# Patient Record
Sex: MALE | Race: BLACK OR AFRICAN AMERICAN | NOT HISPANIC OR LATINO | Employment: FULL TIME | URBAN - METROPOLITAN AREA
[De-identification: names, ages, dates, MRNs, and addresses within clinical notes are randomized per-mention and may not be internally consistent; named-entity substitution may affect disease eponyms.]

---

## 2018-09-07 ENCOUNTER — CLINICAL SUPPORT (OUTPATIENT)
Dept: OCCUPATIONAL MEDICINE | Facility: CLINIC | Age: 28
End: 2018-09-07
Payer: OTHER MISCELLANEOUS

## 2018-09-07 VITALS
BODY MASS INDEX: 31.92 KG/M2 | SYSTOLIC BLOOD PRESSURE: 125 MMHG | HEART RATE: 50 BPM | DIASTOLIC BLOOD PRESSURE: 88 MMHG | HEIGHT: 70 IN | WEIGHT: 223 LBS | RESPIRATION RATE: 15 BRPM | OXYGEN SATURATION: 98 % | TEMPERATURE: 98 F

## 2018-09-07 DIAGNOSIS — J06.9 VIRAL URI WITH COUGH: ICD-10-CM

## 2018-09-07 DIAGNOSIS — R68.89 FLU-LIKE SYMPTOMS: Primary | ICD-10-CM

## 2018-09-07 LAB
CTP QC/QA: YES
FLUAV AG NPH QL: NEGATIVE
FLUBV AG NPH QL: NEGATIVE

## 2018-09-07 PROCEDURE — 99204 OFFICE O/P NEW MOD 45 MIN: CPT | Mod: S$GLB,,, | Performed by: FAMILY MEDICINE

## 2018-09-07 PROCEDURE — 87804 INFLUENZA ASSAY W/OPTIC: CPT | Mod: 59,QW,S$GLB, | Performed by: FAMILY MEDICINE

## 2018-09-07 RX ORDER — BENZONATATE 200 MG/1
200 CAPSULE ORAL 3 TIMES DAILY PRN
Qty: 20 CAPSULE | Refills: 0 | Status: SHIPPED | OUTPATIENT
Start: 2018-09-07 | End: 2023-07-27

## 2018-09-07 NOTE — PROGRESS NOTES
Subjective:       Patient ID: Samir Farias is a 27 y.o. male.    Chief Complaint: Annual Exam    Pt said he felt sick while he was at work. He said he vomited and felt weak. He said he is coughing still.      URI    This is a new problem. The current episode started in the past 7 days. The problem has been unchanged. There has been no fever. Associated symptoms include coughing and headaches. Pertinent negatives include no abdominal pain, chest pain, congestion, ear pain, nausea, sore throat or wheezing. He has tried acetaminophen for the symptoms. The treatment provided mild relief.    patient exposed to a co-worker with similar symptoms last week.  He is unsure about fever is persistent symptom is cough productive of yellowish-green sputum he is overall starting to feel better however.  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  Review of Systems   Constitution: Positive for fever and weakness. Negative for chills and malaise/fatigue.   HENT: Negative for congestion, ear pain, hoarse voice and sore throat.    Eyes: Negative for discharge and redness.   Cardiovascular: Negative for chest pain, dyspnea on exertion and leg swelling.   Respiratory: Positive for cough. Negative for shortness of breath, sputum production and wheezing.    Musculoskeletal: Negative for myalgias.   Gastrointestinal: Negative for abdominal pain and nausea.   Neurological: Positive for headaches.       Objective:      Physical Exam   Constitutional: He appears well-developed and well-nourished. No distress.   HENT:   Head: Normocephalic and atraumatic.   Right Ear: External ear normal.   Left Ear: External ear normal.   Mouth/Throat: Oropharynx is clear and moist. No oropharyngeal exudate.   Eyes: Right eye exhibits no discharge. Left eye exhibits no discharge.   Neck: Normal range of motion.   Cardiovascular: Normal rate, regular rhythm, normal heart sounds and intact distal pulses.    Pulmonary/Chest: Breath sounds normal. He has no wheezes. He has no rales.   Lymphadenopathy:     He has no cervical adenopathy.   Nursing note and vitals reviewed.      Assessment:       No diagnosis found.    Plan:                   No Follow-up on file.

## 2018-09-07 NOTE — PATIENT INSTRUCTIONS
Viral Upper Respiratory Illness (Adult)  You have a viral upper respiratory illness (URI), which is another term for the common cold. This illness is contagious during the first few days. It is spread through the air by coughing and sneezing. It may also be spread by direct contact (touching the sick person and then touching your own eyes, nose, or mouth). Frequent handwashing will decrease risk of spread. Most viral illnesses go away within 7 to 10 days with rest and simple home remedies. Sometimes the illness may last for several weeks. Antibiotics will not kill a virus, and they are generally not prescribed for this condition.    Home care  · If symptoms are severe, rest at home for the first 2 to 3 days. When you resume activity, don't let yourself get too tired.  · Avoid being exposed to cigarette smoke (yours or others).  · You may use acetaminophen or ibuprofen to control pain and fever, unless another medicine was prescribed. (Note: If you have chronic liver or kidney disease, have ever had a stomach ulcer or gastrointestinal bleeding, or are taking blood-thinning medicines, talk with your healthcare provider before using these medicines.) Aspirin should never be given to anyone under 18 years of age who is ill with a viral infection or fever. It may cause severe liver or brain damage.  · Your appetite may be poor, so a light diet is fine. Avoid dehydration by drinking 6 to 8 glasses of fluids per day (water, soft drinks, juices, tea, or soup). Extra fluids will help loosen secretions in the nose and lungs.  · Over-the-counter cold medicines will not shorten the length of time youre sick, but they may be helpful for the following symptoms: cough, sore throat, and nasal and sinus congestion. (Note: Do not use decongestants if you have high blood pressure.)  Follow-up care  Follow up with your healthcare provider, or as advised.  When to seek medical advice  Call your healthcare provider right away if any  of these occur:  · Cough with lots of colored sputum (mucus)  · Severe headache; face, neck, or ear pain  · Difficulty swallowing due to throat pain  · Fever of 100.4°F (38°C)  Call 911, or get immediate medical care  Call emergency services right away if any of these occur:  · Chest pain, shortness of breath, wheezing, or difficulty breathing  · Coughing up blood  · Inability to swallow due to throat pain  Date Last Reviewed: 9/13/2015  © 0145-6217 [employer]. [address] [place] [address]. All rights reserved. This information is not intended as a substitute for professional medical care. Always follow your healthcare professional's instructions.

## 2019-12-28 ENCOUNTER — OFFICE VISIT (OUTPATIENT)
Dept: URGENT CARE | Facility: CLINIC | Age: 29
End: 2019-12-28
Payer: COMMERCIAL

## 2019-12-28 VITALS
HEIGHT: 70 IN | WEIGHT: 223 LBS | OXYGEN SATURATION: 100 % | DIASTOLIC BLOOD PRESSURE: 100 MMHG | SYSTOLIC BLOOD PRESSURE: 142 MMHG | HEART RATE: 84 BPM | RESPIRATION RATE: 18 BRPM | BODY MASS INDEX: 31.92 KG/M2 | TEMPERATURE: 99 F

## 2019-12-28 DIAGNOSIS — Z20.2 EXPOSURE TO STD: Primary | ICD-10-CM

## 2019-12-28 PROCEDURE — 99214 OFFICE O/P EST MOD 30 MIN: CPT | Mod: S$GLB,,, | Performed by: PHYSICIAN ASSISTANT

## 2019-12-28 PROCEDURE — 99214 PR OFFICE/OUTPT VISIT, EST, LEVL IV, 30-39 MIN: ICD-10-PCS | Mod: S$GLB,,, | Performed by: PHYSICIAN ASSISTANT

## 2019-12-28 RX ORDER — AZITHROMYCIN 1 G/1
1 POWDER, FOR SUSPENSION ORAL ONCE
Qty: 1 PACKET | Refills: 0 | Status: SHIPPED | OUTPATIENT
Start: 2019-12-28 | End: 2019-12-28

## 2019-12-28 NOTE — PATIENT INSTRUCTIONS

## 2020-01-01 LAB
C TRACH RRNA SPEC QL NAA+PROBE: NEGATIVE
N GONORRHOEA RRNA SPEC QL NAA+PROBE: NEGATIVE

## 2020-01-02 ENCOUNTER — TELEPHONE (OUTPATIENT)
Dept: URGENT CARE | Facility: CLINIC | Age: 30
End: 2020-01-02

## 2020-01-02 NOTE — TELEPHONE ENCOUNTER
Gave Results    ----- Message from Dru Acosta PA-C sent at 1/2/2020 10:23 AM CST -----  Please call patient regarding neg test results . Please reply to me once completed. Thanks!

## 2020-01-02 NOTE — TELEPHONE ENCOUNTER
"Patient reports that he was concerned about 2 "bumps in my groin area" at the time of testing "They itched for a little bit, But they went away" informed patient that he may RTC if rash presents again, but it was likely folliculitis that self-resolved. Patient v/u.  "

## 2023-04-01 ENCOUNTER — HOSPITAL ENCOUNTER (INPATIENT)
Facility: HOSPITAL | Age: 33
LOS: 1 days | Discharge: HOME | DRG: 091 | End: 2023-04-02
Attending: PSYCHIATRY & NEUROLOGY | Admitting: PSYCHIATRY & NEUROLOGY
Payer: COMMERCIAL

## 2023-04-01 DIAGNOSIS — G08 TRANSVERSE SINUS THROMBOSIS: Primary | ICD-10-CM

## 2023-04-01 DIAGNOSIS — I67.1 CEREBRAL ANEURYSM: ICD-10-CM

## 2023-04-01 PROBLEM — Q28.3 VASCULAR ABNORMALITY OF BRAIN: Status: ACTIVE | Noted: 2023-04-01

## 2023-04-01 PROCEDURE — 99222 PR INITIAL HOSPITAL CARE,LEVL II: ICD-10-PCS | Mod: ,,, | Performed by: NEUROLOGICAL SURGERY

## 2023-04-01 PROCEDURE — 11000001 HC ACUTE MED/SURG PRIVATE ROOM

## 2023-04-01 PROCEDURE — 99223 PR INITIAL HOSPITAL CARE,LEVL III: ICD-10-PCS | Mod: ,,, | Performed by: PSYCHIATRY & NEUROLOGY

## 2023-04-01 PROCEDURE — 63600175 PHARM REV CODE 636 W HCPCS: Performed by: RADIOLOGY

## 2023-04-01 PROCEDURE — 99222 1ST HOSP IP/OBS MODERATE 55: CPT | Mod: ,,, | Performed by: NEUROLOGICAL SURGERY

## 2023-04-01 PROCEDURE — 99223 1ST HOSP IP/OBS HIGH 75: CPT | Mod: ,,, | Performed by: PSYCHIATRY & NEUROLOGY

## 2023-04-01 PROCEDURE — 25000003 PHARM REV CODE 250

## 2023-04-01 PROCEDURE — 25000003 PHARM REV CODE 250: Performed by: PSYCHIATRY & NEUROLOGY

## 2023-04-01 PROCEDURE — 25500020 PHARM REV CODE 255: Performed by: PSYCHIATRY & NEUROLOGY

## 2023-04-01 RX ORDER — SODIUM CHLORIDE 9 MG/ML
INJECTION, SOLUTION INTRAVENOUS CONTINUOUS
Status: ACTIVE | OUTPATIENT
Start: 2023-04-01 | End: 2023-04-01

## 2023-04-01 RX ORDER — MUPIROCIN 20 MG/G
OINTMENT TOPICAL 2 TIMES DAILY
Status: DISCONTINUED | OUTPATIENT
Start: 2023-04-01 | End: 2023-04-02 | Stop reason: HOSPADM

## 2023-04-01 RX ORDER — MIDAZOLAM HYDROCHLORIDE 1 MG/ML
INJECTION INTRAMUSCULAR; INTRAVENOUS
Status: COMPLETED | OUTPATIENT
Start: 2023-04-01 | End: 2023-04-01

## 2023-04-01 RX ORDER — FENTANYL CITRATE 50 UG/ML
INJECTION, SOLUTION INTRAMUSCULAR; INTRAVENOUS
Status: COMPLETED | OUTPATIENT
Start: 2023-04-01 | End: 2023-04-01

## 2023-04-01 RX ORDER — HEPARIN SODIUM 5000 [USP'U]/ML
5000 INJECTION, SOLUTION INTRAVENOUS; SUBCUTANEOUS EVERY 8 HOURS
Status: DISCONTINUED | OUTPATIENT
Start: 2023-04-01 | End: 2023-04-01

## 2023-04-01 RX ORDER — SODIUM CHLORIDE 0.9 % (FLUSH) 0.9 %
10 SYRINGE (ML) INJECTION
Status: DISCONTINUED | OUTPATIENT
Start: 2023-04-01 | End: 2023-04-02 | Stop reason: HOSPADM

## 2023-04-01 RX ORDER — IODIXANOL 320 MG/ML
200 INJECTION, SOLUTION INTRAVASCULAR
Status: COMPLETED | OUTPATIENT
Start: 2023-04-01 | End: 2023-04-01

## 2023-04-01 RX ORDER — LABETALOL HCL 20 MG/4 ML
10 SYRINGE (ML) INTRAVENOUS
Status: DISCONTINUED | OUTPATIENT
Start: 2023-04-01 | End: 2023-04-02 | Stop reason: HOSPADM

## 2023-04-01 RX ORDER — ACETAMINOPHEN 325 MG/1
650 TABLET ORAL EVERY 6 HOURS PRN
Status: DISCONTINUED | OUTPATIENT
Start: 2023-04-01 | End: 2023-04-02 | Stop reason: HOSPADM

## 2023-04-01 RX ORDER — SODIUM CHLORIDE 0.9 % (FLUSH) 0.9 %
10 SYRINGE (ML) INJECTION
Status: CANCELLED | OUTPATIENT
Start: 2023-04-01

## 2023-04-01 RX ADMIN — SODIUM CHLORIDE: 0.9 INJECTION, SOLUTION INTRAVENOUS at 10:04

## 2023-04-01 RX ADMIN — MUPIROCIN: 20 OINTMENT TOPICAL at 10:04

## 2023-04-01 RX ADMIN — IODIXANOL 100 ML: 320 INJECTION, SOLUTION INTRAVASCULAR at 03:04

## 2023-04-01 RX ADMIN — FENTANYL CITRATE 50 MCG: 50 INJECTION, SOLUTION INTRAMUSCULAR; INTRAVENOUS at 02:04

## 2023-04-01 RX ADMIN — MUPIROCIN: 20 OINTMENT TOPICAL at 08:04

## 2023-04-01 RX ADMIN — APIXABAN 5 MG: 5 TABLET, FILM COATED ORAL at 08:04

## 2023-04-01 RX ADMIN — MIDAZOLAM HYDROCHLORIDE 1 MG: 1 INJECTION INTRAMUSCULAR; INTRAVENOUS at 02:04

## 2023-04-01 NOTE — PLAN OF CARE
[facility] ([facility])  Initial Discharge Assessment       Primary Care Provider: Primary Doctor No    Admission Diagnosis: Cerebral aneurysm [I67.1]    Admission Date: 4/1/2023  Expected Discharge Date:     Assessment completed with pts S/O Martina Diallo [phone]. She confirmed home address and that she lives with him occasionally. She reports that pt is independent in ADL's including driving, and working. She showed me a picture of pts [employer] insurance card Member ID # [id number] and stated that she gave it to registration at the first hospital they were at ( [facility] ) before transfer here.  She denied HH and DME. Pt does not have a PCP and will use [facility]. PTs discharge plan is home with family and they will provide transport home . CM following.     Discharge Barriers Identified: None    Payor: /     Extended Emergency Contact Information  Primary Emergency Contact: KEITHASHLEY  Mobile Phone: [phone]  Relation: Mother  Preferred language: English    Discharge Plan A: Home with family  Discharge Plan B: Home      [id number] [address] [facility]  [address] [place] [address]  Phone: [phone] Fax: [phone]      Initial Assessment (most recent)       Adult Discharge Assessment - 04/01/23 1518          Discharge Assessment    Assessment Type Discharge Planning Assessment     Confirmed/corrected address, phone number and insurance Yes     Confirmed Demographics Correct on Facesheet     Source of Information patient;family     Reason For Admission Cerebal Aneurysm     People in Home significant other     Facility Arrived From: Home     Do you expect to return to your current living situation? Yes     Do you have help at home or someone to help you manage your care at home? Yes     Who are your caregiver(s) and their phone number(s)? S/O Martina Diallo [phone]     Prior to hospitilization cognitive status:  Alert/Oriented     Current cognitive status: Unable to Assess     Home Layout Able to live on 1st floor     Equipment Currently Used at Home none     Readmission within 30 days? No     Patient currently being followed by outpatient case management? No     Do you currently have service(s) that help you manage your care at home? No     Do you take prescription medications? Yes     Do you have prescription coverage? Yes     Do you have any problems affording any of your prescribed medications? No     Is the patient taking medications as prescribed? yes     Who is going to help you get home at discharge? S/O Matrina Diallo [phone]     How do you get to doctors appointments? car, drives self;family or friend will provide     Are you on dialysis? No     Do you take coumadin? No     Discharge Plan A Home with family     Discharge Plan B Home     DME Needed Upon Discharge  none     Discharge Plan discussed with: Patient;Spouse/sig other     Name(s) and Number(s) S/O Martina Diallo [phone]     Discharge Barriers Identified None        Physical Activity    On average, how many days per week do you engage in moderate to strenuous exercise (like a brisk walk)? Patient refused     On average, how many minutes do you engage in exercise at this level? Patient refused        Financial Resource Strain    How hard is it for you to pay for the very basics like food, housing, medical care, and heating? Not hard at all        Housing Stability    In the last 12 months, was there a time when you did not have a steady place to sleep or slept in a shelter (including now)? No        Transportation Needs    In the past 12 months, has lack of transportation kept you from medical appointments or from getting medications? No     In the past 12 months, has lack of transportation kept you from meetings, work, or from getting things needed for daily living? No        Food Insecurity    Within the past 12 months, you worried that your food  would run out before you got the money to buy more. Never true     Within the past 12 months, the food you bought just didn't last and you didn't have money to get more. Never true        Stress    Do you feel stress - tense, restless, nervous, or anxious, or unable to sleep at night because your mind is troubled all the time - these days? Patient refused        Social Connections    In a typical week, how many times do you talk on the phone with family, friends, or neighbors? Patient refused     How often do you get together with friends or relatives? Patient refused     How often do you attend Mormonism or Jainism services? Patient refused     Do you belong to any clubs or organizations such as Mormonism groups, unions, fraternal or athletic groups, or school groups? No     How often do you attend meetings of the clubs or organizations you belong to? Patient refused     Are you , , , , never , or living with a partner? Patient refused        Alcohol Use    Q1: How often do you have a drink containing alcohol? Patient refused     Q2: How many drinks containing alcohol do you have on a typical day when you are drinking? Patient refused     Q3: How often do you have six or more drinks on one occasion? Patient refused        OTHER    Name(s) of People in Home S/O Martina Diallo [phone]

## 2023-04-01 NOTE — NURSING
Patient received from recovery, ambulated to bed from stretcher, pedal pulses bilaterally 2+, groin site soft, no signs of hematoma, gauze dressing without drainage, patient has no complaints

## 2023-04-01 NOTE — PLAN OF CARE
Problem: Adult Inpatient Plan of Care  Goal: Plan of Care Review  Outcome: Ongoing, Progressing  Goal: Patient-Specific Goal (Individualized)  Outcome: Ongoing, Progressing  Goal: Absence of Hospital-Acquired Illness or Injury  Outcome: Ongoing, Progressing  Goal: Optimal Comfort and Wellbeing  Outcome: Ongoing, Progressing  Goal: Readiness for Transition of Care  Outcome: Ongoing, Progressing     Problem: Infection  Goal: Absence of Infection Signs and Symptoms  Outcome: Ongoing, Progressing     Problem: Adjustment to Illness (Stroke, Ischemic/Transient Ischemic Attack)  Goal: Optimal Coping  Outcome: Ongoing, Progressing     Problem: Bowel Elimination Impaired (Stroke, Ischemic/Transient Ischemic Attack)  Goal: Effective Bowel Elimination  Outcome: Ongoing, Progressing     Problem: Cerebral Tissue Perfusion (Stroke, Ischemic/Transient Ischemic Attack)  Goal: Optimal Cerebral Tissue Perfusion  Outcome: Ongoing, Progressing     Problem: Cognitive Impairment (Stroke, Ischemic/Transient Ischemic Attack)  Goal: Optimal Cognitive Function  Outcome: Ongoing, Progressing     Problem: Communication Impairment (Stroke, Ischemic/Transient Ischemic Attack)  Goal: Improved Communication Skills  Outcome: Ongoing, Progressing     Problem: Functional Ability Impaired (Stroke, Ischemic/Transient Ischemic Attack)  Goal: Optimal Functional Ability  Outcome: Ongoing, Progressing     Problem: Respiratory Compromise (Stroke, Ischemic/Transient Ischemic Attack)  Goal: Effective Oxygenation and Ventilation  Outcome: Ongoing, Progressing     Problem: Sensorimotor Impairment (Stroke, Ischemic/Transient Ischemic Attack)  Goal: Improved Sensorimotor Function  Outcome: Ongoing, Progressing     Problem: Swallowing Impairment (Stroke, Ischemic/Transient Ischemic Attack)  Goal: Optimal Eating and Swallowing without Aspiration  Outcome: Ongoing, Progressing     Problem: Urinary Elimination Impaired (Stroke, Ischemic/Transient Ischemic  Attack)  Goal: Effective Urinary Elimination  Outcome: Ongoing, Progressing     Patient with no deficits, NPO pending tests and potential interventions. No complaints at this time (Stroke education hand out at bedside and covered with patient)

## 2023-04-01 NOTE — CONSULTS
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 IR consulted to rule out possibility of isolated unruptured varix.     Pt with mild to moderate headaches intermittently over last week or so. Subjectively asymptomatic and without focal deficit at time of exam.     Will plan for DSA today to rule out vascular etiology.           OBJECTIVE:   Vital Signs (Most Recent):        Vital Signs (24h Range):   Temp:  [98.4 °F (36.9 °C)-98.8 °F (37.1 °C)] 98.4 °F (36.9 °C)  Pulse:  [78-90] 84  Resp:  [16-18] 16  SpO2:  [98 %-100 %] 100 %  BP: (113-136)/(77-91) 114/77      I & O (Last 24h):  No intake or output data in the 24 hours ending 04/01/23 0712    Physical Exam:  General: well developed, well nourished, no distress.   Head: normocephalic, atraumatic  Cervical Spine: No midline tenderness to palpation.  Thoracolumbosacral Spine: No midline tenderness to palpation.  GCS: Motor: 6/Verbal: 5/Eyes: 4 GCS Total: 15  Mental Status: Awake, Alert, Oriented x 4  Language: No aphasia  Speech: No dysarthria  Facial Droop: None   Cranial nerves: CN III-XII grossly intact.  Visual Fields: Intact.   Eyes: Pupils equal and reactive to light. Intact Conjugate horizontal and vertical pursuit. No nystagmus. No gaze deviation.   Pulmonary: No distress.  Sensory: No deficit.  Propioception: No deficit in 1st digit of toe bilaterally.  Rectal Tone: Not tested.  Drift: None.  Upper Extremity Ataxia: No Dysmetria Bilaterally  Lower Extremity Ataxia: Not tested.  Dysdiadochokinesia: Not tested.  Reflexes: 2+ patellar bilaterally.  Lira: Absent  Clonus: Absent  Babinski: Absent  Romberg: Not Tested  Pulses: Brisk and symmetric  radial, DP and tibial pulses.  Motor Strength:    Strength  Shoulder Abduction Elbow Extension Elbow Flexion Wrist Extension Wrist Flexion Finger Opposition Finger Add Finger Abd   Upper: R 5/5 5/5 5/5 5/5 5/5 5/5 5/5 5/5    L 5/5 5/5 5/5 5/5 5/5 5/5 5/5 5/5     Hip Flexion Knee Extension Knee  Flexion Ankle Dflexion Ankle Pflexion EHL     Lower: R 5/5 5/5 5/5 5/5 5/5 5/5      L 5/5 5/5 5/5 5/5 5/5 5/5              Lines/Drains/Airway:          Nutrition/Tube Feeds:   Current Diet Order   Procedures    Diet NPO     No eating, drinking, or oral medications until patient passes the Mcgovern or evaluated by Speech.       Labs:  ABG: No results for input(s): PH, PO2, PCO2, HCO3, POCSATURATED, BE in the last 24 hours.  BMP:  Recent Labs   Lab 03/31/23  1656      K 4.2      CO2 27   BUN 11   CREATININE 0.96   *     LFT:   Lab Results   Component Value Date    AST 56 03/31/2023    ALT 58 (H) 03/31/2023    ALKPHOS 54 03/31/2023    BILITOT 1.0 03/31/2023    ALBUMIN 5.4 (H) 03/31/2023    PROT 9.3 (H) 03/31/2023     CBC:   Lab Results   Component Value Date    WBC 8.03 03/31/2023    HGB 15.3 03/31/2023    HCT 44.8 03/31/2023    MCV 90 03/31/2023     03/31/2023     Microbiology x 7d:   Microbiology Results (last 7 days)       ** No results found for the last 168 hours. 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 IR consulted to rule out possibility of isolated unruptured varix.     --Pt booked and consented for diagnostic DSA today, tentatively 2pm.  --Continue care per primary team.  --We will continue to monitor closely, please contact us with any questions or concerns.    Ty Parker     Moderate Sedation  ASA 2  Mallampati III

## 2023-04-01 NOTE — LETTER
April 2, 2023         [address]  [place] [address]  Phone: [phone]  Fax: [phone]       Patient: Samir Farias   YOB: 1990  Date of Visit: 04/02/2023    To Whom It May Concern:    María Farias  was at [facility] on 04/02/2023. The patient may return to work/school on 4/10/2023 with restrictions (outlined below) until off medications. If you have any questions or concerns, or if I can be of further assistance, please do not hesitate to contact me.    Sincerely,    Goran Andino MD   [facility]  [address] [place], [place] [id number] [phone]     Restrictions:  Avoiding high impact activity (contact sports)  Assistance with heavy objects   Avoiding overhead lifting

## 2023-04-01 NOTE — H&P
Silvino Espinosa - Neurosurgery ([facility])  Vascular Neurology  Comprehensive Stroke Center  History & Physical    Consults  Assessment/Plan:     Patient is a 32 y.o. year old male with:    * Transverse sinus thrombosis  33 yo M w no significant PMH presenting as direct transfer from [facility] for transverse sinus thrombus and vascular abnormality seen on MRI 3/29/23. He refers a pressure like HA behind his eyes that started 10 days ago, with on exacerbating factors nor any neurological deficits such as diplopia, blurry vision, weakness, numbness. (see HPI for details) He denies any new medications or drug use. Initial CT head OSH showed transverse sinus thrombosis and possible R occipital IPH. MRI brain confirmed VST but showed possible saccular aneurysm vs vascular abnormality at R temporal lobe and after review by NCC and Vascular Neurology recommendations were to transfer here for DSA prior to initiatin anticoagulation for VST. On arrival patient is neuro intact, no headaches.     Antithrombotics for secondary stroke prevention: Anticoagulants: will need to be started on AC, likely heparin gtt but need IR evaluation prior to this    Statins for secondary stroke prevention and hyperlipidemia, if present:   Statins: None: Reason: no indication, no infarct    Aggressive risk factor modification: None     Rehab efforts: The patient has been evaluated by a stroke team provider and the therapy needs have been fully considered based off the presenting complaints and exam findings. The following therapy evaluations are needed: no need for evaluation at his baseline     Diagnostics ordered/pending: Other: DSA     VTE prophylaxis: Heparin 5000 units SQ every 8 hours          Vascular abnormality of brain  -- See transverse sinus thrombosis         STROKE DOCUMENTATION          NIH Scale:  1a. Level of Consciousness: 0-->Alert, keenly responsive  1b. LOC Questions: 0-->Answers both questions correctly  1c. LOC Commands:  0-->Performs both tasks correctly  2. Best Gaze: 0-->Normal  3. Visual: 0-->No visual loss  4. Facial Palsy: 0-->Normal symmetrical movements  5a. Motor Arm, Left: 0-->No drift, limb holds 90 (or 45) degrees for full 10 secs  5b. Motor Arm, Right: 0-->No drift, limb holds 90 (or 45) degrees for full 10 secs  6a. Motor Leg, Left: 0-->No drift, leg holds 30 degree position for full 5 secs  6b. Motor Leg, Right: 0-->No drift, leg holds 30 degree position for full 5 secs  7. Limb Ataxia: 0-->Absent  8. Sensory: 0-->Normal, no sensory loss  9. Best Language: 0-->No aphasia, normal  10. Dysarthria: 0-->Normal  11. Extinction and Inattention (formerly Neglect): 0-->No abnormality  Total (NIH Stroke Scale): 0     Modified Eunice Score: 0  Agnes Coma Scale:    ABCD2 Score:    [id number] Score:   HAS -BLED Score:   ICH Score:   Hunt & Navas Classification:      Thrombolysis Candidate? No, Non-disabling symptoms - Low NIHSS , VST    Delays to Thrombolysis?  No    Interventional Revascularization Candidate?   Is the patient eligible for mechanical endovascular reperfusion (OSMANY)?  No; Large core infarct on imaging  and No; at this time symptoms not suggestive of large vessel occlusion    Delays to Thrombectomy? No    Hemorrhagic change of an Ischemic Stroke: Does this patient have an ischemic stroke with hemorrhagic changes? No         Subjective:     History of Present Illness:  33 yo M w no significant PMH presenting as direct transfer to vascular neurology for R transverse sinus thrombus and vascular abnormality seen on MRI 3/29/23. Patient initially presented to [facility] after he was told he had abnrmal findings on  CT head he had done this same day for an ongoing Headache. he refers that last Thursday 3/23/23 he developed a progressive tension like headache with pressure like symptoms behind his eyes. He does not usually suffer for headaches and he took Tylenol with minimal relief. He continued to have these during the  day time, he describes them as sinus like, he denies any pain with EOM, diplopia, blurry vision, exacerbation with leaning forward, no focal weakness, numbness or gate instability. He went to an urgent care who recommended and CT head which he had done and showed per report a transverse sinus thrombosis and possible hemorrhage at R occipital lobe and he was called and asked to go the the ED for further evaluation. At the ED NCC called who recommended MRI brain that showed vascular abnormality by the R temporal lobe in addition of already mentioned VST. Dr. Huitron was called and recommended transfer to [facility] for further evaluation by IR and possibly DSA prior to starting anti coagulation given potential bleeding risk.   On arrival to [facility] he denies any headaches or other symptoms, no neuro deficits on exam. NIHSS 0.    Patient denies any history of blood clots, DVT/PE, he denies any new medications other than a one time dose of clonidine during his headaches for an elevated blood pressure. There is family history of intracranial blood clots and PE, as well as abdominal aneurysm. He is a former smoker (quit 5 years ago) and denies any drug use.           No past medical history on file.  No past surgical history on file.  Family History   Problem Relation Age of Onset    No Known Problems Mother     No Known Problems Father      Social History     Tobacco Use    Smoking status: Light Smoker     Types: Cigars    Smokeless tobacco: Never   Substance Use Topics    Alcohol use: No    Drug use: No     Review of patient's allergies indicates:   Allergen Reactions    Ceclor [cefaclor]        Medications: I have reviewed the current medication administration record.    No medications prior to admission.       Review of Systems   Constitutional:  Negative for appetite change, chills and fever.   HENT:  Negative for congestion, sore throat, trouble swallowing and voice change.    Eyes: Negative.    Respiratory:  Negative  for cough and shortness of breath.    Cardiovascular:  Negative for chest pain and leg swelling.   Gastrointestinal:  Negative for abdominal distention, abdominal pain, constipation, nausea and vomiting.   Endocrine: Negative.    Genitourinary: Negative.    Musculoskeletal:  Negative for back pain and gait problem.   Skin: Negative.    Neurological:  Positive for headaches. Negative for weakness.   Psychiatric/Behavioral: Negative.     Objective:     Vital Signs (Most Recent):       Vital Signs Range (Last 24H):  Temp:  [98.4 °F (36.9 °C)-98.8 °F (37.1 °C)]   Pulse:  [78-90]   Resp:  [16-18]   BP: (113-136)/(77-91)   SpO2:  [98 %-100 %]     Physical Exam  Vitals and nursing note reviewed.   Constitutional:       Appearance: Normal appearance.   HENT:      Head: Normocephalic and atraumatic.      Nose: Nose normal.      Mouth/Throat:      Mouth: Mucous membranes are moist.   Eyes:      Extraocular Movements: Extraocular movements intact.      Pupils: Pupils are equal, round, and reactive to light.   Cardiovascular:      Rate and Rhythm: Normal rate and regular rhythm.   Pulmonary:      Effort: No respiratory distress.   Abdominal:      General: Bowel sounds are normal. There is no distension.      Palpations: Abdomen is soft.      Tenderness: There is no abdominal tenderness.   Musculoskeletal:         General: No swelling. Normal range of motion.      Cervical back: Normal range of motion.   Skin:     General: Skin is warm.      Capillary Refill: Capillary refill takes less than 2 seconds.   Neurological:      General: No focal deficit present.      Mental Status: He is alert and oriented to person, place, and time. Mental status is at baseline.       Neurological Exam:   LOC: alert  Attention Span: Good   Language: No aphasia  Articulation: No dysarthria  Orientation: Person, Place, Time   Visual Fields: Full  EOM (CN III, IV, VI): Full/intact  Facial Movement (CN VII): Symmetric facial expression    Motor: Arm  left  Normal 5/5  Leg left  Normal 5/5  Arm right  Normal 5/5  Leg right Normal 5/5  Sensation: Intact to light touch, temperature and vibration      Laboratory:  CMP:   Recent Labs   Lab 03/31/23  1656   CALCIUM 9.6   ALBUMIN 5.4*   PROT 9.3*      K 4.2   CO2 27      BUN 11   CREATININE 0.96   ALKPHOS 54   ALT 58*   AST 56   BILITOT 1.0     CBC:   Recent Labs   Lab 03/31/23  1656   WBC 8.03   RBC 5.00   HGB 15.3   HCT 44.8      MCV 90   MCH 30.6   MCHC 34.2     Lipid Panel: No results for input(s): CHOL, LDLCALC, HDL, TRIG in the last 168 hours.  Coagulation:   Recent Labs   Lab 03/31/23  1656   INR 1.1   APTT 30.8     Hgb A1C: No results for input(s): HGBA1C in the last 168 hours.  TSH: No results for input(s): TSH in the last 168 hours.    Diagnostic Results:      Brain imaging:  Brain MRI       Impression:     1. No acute infarct or hemorrhage.  2. Suspected vascular anomaly, possibly saccular aneurysm, in the medial right temporal lobe.  It is unclear whether this is entirely arterial in nature.  Circumscribed enhancing extra-axial neoplastic lesion considered unlikely.  3. Thrombus in the right transverse sinus    Vessel Imaging:  Pending     Cardiac Evaluation:   Pending           Yuliet Urias MD  Comprehensive Stroke Center  Department of Vascular Neurology   [facility] Neurosurgery ([facility])

## 2023-04-01 NOTE — PLAN OF CARE
Pt arrived to room IR 4 for a cerebral angiogram. Pt oriented to room and introduced to staff. Pt.  positioned supine on table. monitors applied.

## 2023-04-01 NOTE — BRIEF OP NOTE
Radiology Post-Procedure Note     Pre Op Diagnosis: Extraaxial tentorial mass, subocclussive dural venous sinus thrombus     Post Op Diagnosis: Isolated venous varix, filling defect of right sigmoid sinus     Procedure: Cerebral angiogram     Procedure performed by: Berto Tran     Written Informed Consent Obtained: Yes     Specimen Removed: NO     Estimated Blood Loss: Minimal     Procedure report:      A 5F-10 sheath was placed into the right common femoral artery and a 5F Jude was advanced into the aortic arch.  The bilateral ICAs, ECAs, and VAs were subselected and angiography of the brain was performed. There is a subocclussive filling defect of the right sigmoid sinus. There is an isolated venous varix along the right infratemporal venous trunk as it runs along the tentorium to the transverse sinus. There is no evidence of early arterial venous shunting. No arteriovenous fistulae or malformation. No evidence of aneurysm or significant stenoocclussive disease.      Preliminary interpretation:      Right sigmoid sub-occlussive dural venous sinus filling defect.  Right isolated venous varix along superior tentorium     Please see Imaging report for full details.     A right common femoral artery angiogram was performed, the sheath removed and hemostasis achieved using a 5F Exoseal closure device.  No hematoma was present at the time of hemostasis.     The patient tolerated the procedure well.

## 2023-04-01 NOTE — ASSESSMENT & PLAN NOTE
-- See transverse sinus thrombosis    DISPLAY PLAN FREE TEXT DISPLAY PLAN FREE TEXT DISPLAY PLAN FREE TEXT DISPLAY PLAN FREE TEXT DISPLAY PLAN FREE TEXT DISPLAY PLAN FREE TEXT DISPLAY PLAN FREE TEXT

## 2023-04-01 NOTE — PLAN OF CARE
Report called to floor RN. Right Fem access site closed with closure device. Site is c/d/I with no hematoma. Right DP/PT pulses are palpable. VSS. NAD. Denies pain. Pt received 3 mg of Versed and 100 mcg of Fentanyl during procedure. Please see procedure note for findings.  Mother of pt called and notified pt was doing well.

## 2023-04-01 NOTE — HPI
33 yo M w no significant PMH presenting as direct transfer to vascular neurology for R transverse sinus thrombus and vascular abnormality seen on MRI 3/29/23. Patient initially presented to [facility] after he was told he had abnrmal findings on  CT head he had done this same day for an ongoing Headache. he refers that last Thursday 3/23/23 he developed a progressive tension like headache with pressure like symptoms behind his eyes. He does not usually suffer for headaches and he took Tylenol with minimal relief. He continued to have these during the day time, he describes them as sinus like, he denies any pain with EOM, diplopia, blurry vision, exacerbation with leaning forward, no focal weakness, numbness or gate instability. He went to an urgent care who recommended and CT head which he had done and showed per report a transverse sinus thrombosis and possible hemorrhage at R occipital lobe and he was called and asked to go the the ED for further evaluation. At the ED NCC called who recommended MRI brain that showed vascular abnormality by the R temporal lobe in addition of already mentioned VST. Dr. Huitron was called and recommended transfer to [facility] for further evaluation by IR and possibly DSA prior to starting anti coagulation given potential bleeding risk.   On arrival to [facility] he denies any headaches or other symptoms, no neuro deficits on exam. NIHSS 0.    Patient denies any history of blood clots, DVT/PE, he denies any new medications other than a one time dose of clonidine during his headaches for an elevated blood pressure. There is family history of intracranial blood clots and PE, as well as abdominal aneurysm. He is a former smoker (quit 5 years ago) and denies any drug use.

## 2023-04-01 NOTE — LETTER
April 2, 2023         [address]  [place] [address]  Phone: [phone]  Fax: [phone]       Patient: Samir Farias   YOB: 1990  Date of Visit: 04/02/2023    To Whom It May Concern:    María Farias  was at [facility] on 04/02/2023. The patient may return to work/school on 4/10/2023 with restrictions. If you have any questions or concerns, or if I can be of further assistance, please do not hesitate to contact me.    Sincerely,    Goran Andino MD   [facility]  [address] [place] [address] [phone]

## 2023-04-01 NOTE — H&P
Consult Note  Interventional Radiology    Admit Date: 4/1/2023  LOS: 0    Code Status: Full Code     CC: Transverse sinus thrombosis    SUBJECTIVE:     History of Present Illness: 33 yo male with suspected DVST of the right transverse sinus along with a homogenously enhancing dural based encapsulated mass along the superior leaf of the ipsilateral tentorium. IR consulted to rule out possibility of isolated unruptured varix.     Pt with mild to moderate headaches intermittently over last week or so. Subjectively asymptomatic and without focal deficit at time of exam.     Will plan for DSA today to rule out vascular etiology.           OBJECTIVE:   Vital Signs (Most Recent):   Temp: 98.6 °F (37 °C) (04/01/23 0755)  Pulse: 69 (04/01/23 0755)  Resp: 18 (04/01/23 0445)  BP: (!) 113/57 (04/01/23 0755)  SpO2: 97 % (04/01/23 0755)    Vital Signs (24h Range):   Temp:  [98.1 °F (36.7 °C)-98.8 °F (37.1 °C)] 98.6 °F (37 °C)  Pulse:  [69-90] 69  Resp:  [16-18] 18  SpO2:  [97 %-100 %] 97 %  BP: (113-162)/(57-91) 113/57      I & O (Last 24h):  No intake or output data in the 24 hours ending 04/01/23 0909    Physical Exam:  General: well developed, well nourished, no distress.   Head: normocephalic, atraumatic  Cervical Spine: No midline tenderness to palpation.  Thoracolumbosacral Spine: No midline tenderness to palpation.  GCS: Motor: 6/Verbal: 5/Eyes: 4 GCS Total: 15  Mental Status: Awake, Alert, Oriented x 4  Language: No aphasia  Speech: No dysarthria  Facial Droop: None   Cranial nerves: CN III-XII grossly intact.  Visual Fields: Intact.   Eyes: Pupils equal and reactive to light. Intact Conjugate horizontal and vertical pursuit. No nystagmus. No gaze deviation.   Pulmonary: No distress.  Sensory: No deficit.  Propioception: No deficit in 1st digit of toe bilaterally.  Rectal Tone: Not tested.  Drift: None.  Upper Extremity Ataxia: No Dysmetria Bilaterally  Lower Extremity Ataxia: Not tested.  Dysdiadochokinesia: Not  tested.  Reflexes: 2+ patellar bilaterally.  Lira: Absent  Clonus: Absent  Babinski: Absent  Romberg: Not Tested  Pulses: Brisk and symmetric radial, DP and tibial pulses.  Motor Strength:    Strength  Shoulder Abduction Elbow Extension Elbow Flexion Wrist Extension Wrist Flexion Finger Opposition Finger Add Finger Abd   Upper: R 5/5 5/5 5/5 5/5 5/5 5/5 5/5 5/5    L 5/5 5/5 5/5 5/5 5/5 5/5 5/5 5/5     Hip Flexion Knee Extension Knee  Flexion Ankle Dflexion Ankle Pflexion EHL     Lower: R 5/5 5/5 5/5 5/5 5/5 5/5      L 5/5 5/5 5/5 5/5 5/5 5/5              Lines/Drains/Airway:          Nutrition/Tube Feeds:   Current Diet Order   Procedures    Diet NPO     No eating, drinking, or oral medications until patient passes the Mcgovern or evaluated by Speech.       Labs:  ABG: No results for input(s): PH, PO2, PCO2, HCO3, POCSATURATED, BE in the last 24 hours.  BMP:  Recent Labs   Lab 03/31/23  1656      K 4.2      CO2 27   BUN 11   CREATININE 0.96   *       LFT:   Lab Results   Component Value Date    AST 56 03/31/2023    ALT 58 (H) 03/31/2023    ALKPHOS 54 03/31/2023    BILITOT 1.0 03/31/2023    ALBUMIN 5.4 (H) 03/31/2023    PROT 9.3 (H) 03/31/2023     CBC:   Lab Results   Component Value Date    WBC 8.03 03/31/2023    HGB 15.3 03/31/2023    HCT 44.8 03/31/2023    MCV 90 03/31/2023     03/31/2023     Microbiology x 7d:   Microbiology Results (last 7 days)       ** No results found for the last 168 hours. 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 IR consulted to rule out possibility of isolated unruptured varix.     --Pt booked and consented for diagnostic DSA today, tentatively 2pm.  --Continue care per primary team.  --We will continue to monitor closely, please contact us with any questions or concerns.    Ty Parker      Moderate Sedation  ASA 2  Mallampati III

## 2023-04-01 NOTE — SUBJECTIVE & OBJECTIVE
No past medical history on file.  No past surgical history on file.  Family History   Problem Relation Age of Onset    No Known Problems Mother     No Known Problems Father      Social History     Tobacco Use    Smoking status: Light Smoker     Types: Cigars    Smokeless tobacco: Never   Substance Use Topics    Alcohol use: No    Drug use: No     Review of patient's allergies indicates:   Allergen Reactions    Ceclor [cefaclor]        Medications: I have reviewed the current medication administration record.    No medications prior to admission.       Review of Systems   Constitutional:  Negative for appetite change, chills and fever.   HENT:  Negative for congestion, sore throat, trouble swallowing and voice change.    Eyes: Negative.    Respiratory:  Negative for cough and shortness of breath.    Cardiovascular:  Negative for chest pain and leg swelling.   Gastrointestinal:  Negative for abdominal distention, abdominal pain, constipation, nausea and vomiting.   Endocrine: Negative.    Genitourinary: Negative.    Musculoskeletal:  Negative for back pain and gait problem.   Skin: Negative.    Neurological:  Positive for headaches. Negative for weakness.   Psychiatric/Behavioral: Negative.     Objective:     Vital Signs (Most Recent):       Vital Signs Range (Last 24H):  Temp:  [98.4 °F (36.9 °C)-98.8 °F (37.1 °C)]   Pulse:  [78-90]   Resp:  [16-18]   BP: (113-136)/(77-91)   SpO2:  [98 %-100 %]     Physical Exam  Vitals and nursing note reviewed.   Constitutional:       Appearance: Normal appearance.   HENT:      Head: Normocephalic and atraumatic.      Nose: Nose normal.      Mouth/Throat:      Mouth: Mucous membranes are moist.   Eyes:      Extraocular Movements: Extraocular movements intact.      Pupils: Pupils are equal, round, and reactive to light.   Cardiovascular:      Rate and Rhythm: Normal rate and regular rhythm.   Pulmonary:      Effort: No respiratory distress.   Abdominal:      General: Bowel  sounds are normal. There is no distension.      Palpations: Abdomen is soft.      Tenderness: There is no abdominal tenderness.   Musculoskeletal:         General: No swelling. Normal range of motion.      Cervical back: Normal range of motion.   Skin:     General: Skin is warm.      Capillary Refill: Capillary refill takes less than 2 seconds.   Neurological:      General: No focal deficit present.      Mental Status: He is alert and oriented to person, place, and time. Mental status is at baseline.       Neurological Exam:   LOC: alert  Attention Span: Good   Language: No aphasia  Articulation: No dysarthria  Orientation: Person, Place, Time   Visual Fields: Full  EOM (CN III, IV, VI): Full/intact  Facial Movement (CN VII): Symmetric facial expression    Motor: Arm left  Normal 5/5  Leg left  Normal 5/5  Arm right  Normal 5/5  Leg right Normal 5/5  Sensation: Intact to light touch, temperature and vibration      Laboratory:  CMP:   Recent Labs   Lab 03/31/23  1656   CALCIUM 9.6   ALBUMIN 5.4*   PROT 9.3*      K 4.2   CO2 27      BUN 11   CREATININE 0.96   ALKPHOS 54   ALT 58*   AST 56   BILITOT 1.0     CBC:   Recent Labs   Lab 03/31/23  1656   WBC 8.03   RBC 5.00   HGB 15.3   HCT 44.8      MCV 90   MCH 30.6   MCHC 34.2     Lipid Panel: No results for input(s): CHOL, LDLCALC, HDL, TRIG in the last 168 hours.  Coagulation:   Recent Labs   Lab 03/31/23  1656   INR 1.1   APTT 30.8     Hgb A1C: No results for input(s): HGBA1C in the last 168 hours.  TSH: No results for input(s): TSH in the last 168 hours.    Diagnostic Results:      Brain imaging:  Brain MRI       Impression:     1. No acute infarct or hemorrhage.  2. Suspected vascular anomaly, possibly saccular aneurysm, in the medial right temporal lobe.  It is unclear whether this is entirely arterial in nature.  Circumscribed enhancing extra-axial neoplastic lesion considered unlikely.  3. Thrombus in the right transverse sinus    Vessel  Imaging:  Pending     Cardiac Evaluation:   Pending

## 2023-04-01 NOTE — ASSESSMENT & PLAN NOTE
31 yo M w no significant PMH presenting as direct transfer from [facility] for transverse sinus thrombus and vascular abnormality seen on MRI 3/29/23. He refers a pressure like HA behind his eyes that started 10 days ago, with on exacerbating factors nor any neurological deficits such as diplopia, blurry vision, weakness, numbness. (see HPI for details) He denies any new medications or drug use. Initial CT head OSH showed transverse sinus thrombosis and possible R occipital IPH. MRI brain confirmed VST but showed possible saccular aneurysm vs vascular abnormality at R temporal lobe and after review by NCC and Vascular Neurology recommendations were to transfer here for DSA prior to initiatin anticoagulation for VST. On arrival patient is neuro intact, no headaches.     Antithrombotics for secondary stroke prevention: Anticoagulants: will need to be started on AC, likely heparin gtt but need IR evaluation prior to this    Statins for secondary stroke prevention and hyperlipidemia, if present:   Statins: None: Reason: no indication, no infarct    Aggressive risk factor modification: None     Rehab efforts: The patient has been evaluated by a stroke team provider and the therapy needs have been fully considered based off the presenting complaints and exam findings. The following therapy evaluations are needed: no need for evaluation at his baseline     Diagnostics ordered/pending: Other: DSA     VTE prophylaxis: Heparin 5000 units SQ every 8 hours

## 2023-04-01 NOTE — CONSULTS
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 Radiology also with concern for DVST and associated dAVF with isolated venous varix be considered on differential.     Pt with mild to moderate headaches intermittently over last week or so. Subjectively asymptomatic and without focal deficit at time of exam.    IR planning DSA today to rule out vascular etiology.           OBJECTIVE:   Vital Signs (Most Recent):        Vital Signs (24h Range):   Temp:  [98.4 °F (36.9 °C)-98.8 °F (37.1 °C)] 98.4 °F (36.9 °C)  Pulse:  [78-90] 84  Resp:  [16-18] 16  SpO2:  [98 %-100 %] 100 %  BP: (113-136)/(77-91) 114/77      I & O (Last 24h):  No intake or output data in the 24 hours ending 04/01/23 0717    Physical Exam:  General: well developed, well nourished, no distress.   Head: normocephalic, atraumatic  Cervical Spine: No midline tenderness to palpation.  Thoracolumbosacral Spine: No midline tenderness to palpation.  GCS: Motor: 6/Verbal: 5/Eyes: 4 GCS Total: 15  Mental Status: Awake, Alert, Oriented x 4  Language: No aphasia  Speech: No dysarthria  Facial Droop: None   Cranial nerves: CN III-XII grossly intact.  Visual Fields: Intact.   Eyes: Pupils equal and reactive to light. Intact Conjugate horizontal and vertical pursuit. No nystagmus. No gaze deviation.   Pulmonary: No distress.  Sensory: No deficit.  Propioception: No deficit in 1st digit of toe bilaterally.  Rectal Tone: Not tested.  Drift: None.  Upper Extremity Ataxia: No Dysmetria Bilaterally  Lower Extremity Ataxia: Not tested.  Dysdiadochokinesia: Not tested.  Reflexes: 2+ patellar bilaterally.  Lira: Absent  Clonus: Absent  Babinski: Absent  Romberg: Not  Tested  Pulses: Brisk and symmetric radial, DP and tibial pulses.  Motor Strength:    Strength  Shoulder Abduction Elbow Extension Elbow Flexion Wrist Extension Wrist Flexion Finger Opposition Finger Add Finger Abd   Upper: R 5/5 5/5 5/5 5/5 5/5 5/5 5/5 5/5    L 5/5 5/5 5/5 5/5 5/5 5/5 5/5 5/5     Hip Flexion Knee Extension Knee  Flexion Ankle Dflexion Ankle Pflexion EHL     Lower: R 5/5 5/5 5/5 5/5 5/5 5/5      L 5/5 5/5 5/5 5/5 5/5 5/5              Lines/Drains/Airway:          Nutrition/Tube Feeds:   Current Diet Order   Procedures    Diet NPO     No eating, drinking, or oral medications until patient passes the [place] or evaluated by Speech.       Labs:  ABG: No results for input(s): PH, PO2, PCO2, HCO3, POCSATURATED, BE in the last 24 hours.  BMP:  Recent Labs   Lab 03/31/23  1656      K 4.2      CO2 27   BUN 11   CREATININE 0.96   *       LFT:   Lab Results   Component Value Date    AST 56 03/31/2023    ALT 58 (H) 03/31/2023    ALKPHOS 54 03/31/2023    BILITOT 1.0 03/31/2023    ALBUMIN 5.4 (H) 03/31/2023    PROT 9.3 (H) 03/31/2023     CBC:   Lab Results   Component Value Date    WBC 8.03 03/31/2023    HGB 15.3 03/31/2023    HCT 44.8 03/31/2023    MCV 90 03/31/2023     03/31/2023     Microbiology x 7d:   Microbiology Results (last 7 days)       ** No results found for the last 168 hours. 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 Radiology also with concern for DVST and associated dAVF with isolated venous varix be considered on differential.     --Continue care per primary team.  --Agree with DSA to rule out vascular pathology.  --further recommendations pending results.  --We will continue to monitor closely, please contact us with any questions or concerns.    Ty Parker     Update 4/1/23  1534    --Angiogram with no evidence of dAVF, AVM, or aneurysm. Pt with isolated venous varix without early arteriovenous shunting.  --Pt also with subocclussive filling defect in right sigmoid sinus.  --Will arrange for pt to follow up in outpatient clinic.  --Continue care per primary team.  --Neurosurgery will be signing off, please contact us with any questions or concerns.

## 2023-04-02 VITALS
HEART RATE: 66 BPM | BODY MASS INDEX: 35.59 KG/M2 | DIASTOLIC BLOOD PRESSURE: 83 MMHG | TEMPERATURE: 99 F | OXYGEN SATURATION: 99 % | HEIGHT: 69 IN | RESPIRATION RATE: 18 BRPM | SYSTOLIC BLOOD PRESSURE: 127 MMHG | WEIGHT: 240.31 LBS

## 2023-04-02 LAB
ALBUMIN SERPL BCP-MCNC: 3.9 G/DL (ref 3.5–5.2)
ALP SERPL-CCNC: 44 U/L (ref 55–135)
ALT SERPL W/O P-5'-P-CCNC: 34 U/L (ref 10–44)
ANION GAP SERPL CALC-SCNC: 8 MMOL/L (ref 8–16)
APTT PPP: 30.1 SEC (ref 21–32)
AST SERPL-CCNC: 24 U/L (ref 10–40)
BASOPHILS # BLD AUTO: 0.04 K/UL (ref 0–0.2)
BASOPHILS NFR BLD: 0.5 % (ref 0–1.9)
BILIRUB SERPL-MCNC: 0.5 MG/DL (ref 0.1–1)
BUN SERPL-MCNC: 19 MG/DL (ref 6–20)
CALCIUM SERPL-MCNC: 8.9 MG/DL (ref 8.7–10.5)
CHLORIDE SERPL-SCNC: 104 MMOL/L (ref 95–110)
CO2 SERPL-SCNC: 23 MMOL/L (ref 23–29)
CREAT SERPL-MCNC: 0.9 MG/DL (ref 0.5–1.4)
DIFFERENTIAL METHOD: ABNORMAL
EOSINOPHIL # BLD AUTO: 0.1 K/UL (ref 0–0.5)
EOSINOPHIL NFR BLD: 0.9 % (ref 0–8)
ERYTHROCYTE [DISTWIDTH] IN BLOOD BY AUTOMATED COUNT: 13.5 % (ref 11.5–14.5)
EST. GFR  (NO RACE VARIABLE): >60 ML/MIN/1.73 M^2
GLUCOSE SERPL-MCNC: 88 MG/DL (ref 70–110)
HCT VFR BLD AUTO: 42.3 % (ref 40–54)
HGB BLD-MCNC: 13.6 G/DL (ref 14–18)
IMM GRANULOCYTES # BLD AUTO: 0.03 K/UL (ref 0–0.04)
IMM GRANULOCYTES NFR BLD AUTO: 0.4 % (ref 0–0.5)
INR PPP: 1 (ref 0.8–1.2)
LYMPHOCYTES # BLD AUTO: 2.9 K/UL (ref 1–4.8)
LYMPHOCYTES NFR BLD: 39.1 % (ref 18–48)
MAGNESIUM SERPL-MCNC: 2.2 MG/DL (ref 1.6–2.6)
MCH RBC QN AUTO: 30.1 PG (ref 27–31)
MCHC RBC AUTO-ENTMCNC: 32.2 G/DL (ref 32–36)
MCV RBC AUTO: 94 FL (ref 82–98)
MONOCYTES # BLD AUTO: 0.8 K/UL (ref 0.3–1)
MONOCYTES NFR BLD: 11.3 % (ref 4–15)
NEUTROPHILS # BLD AUTO: 3.6 K/UL (ref 1.8–7.7)
NEUTROPHILS NFR BLD: 47.8 % (ref 38–73)
NRBC BLD-RTO: 0 /100 WBC
PHOSPHATE SERPL-MCNC: 3.3 MG/DL (ref 2.7–4.5)
PLATELET # BLD AUTO: 223 K/UL (ref 150–450)
PMV BLD AUTO: 8.8 FL (ref 9.2–12.9)
POTASSIUM SERPL-SCNC: 3.9 MMOL/L (ref 3.5–5.1)
PROT SERPL-MCNC: 6.7 G/DL (ref 6–8.4)
PROTHROMBIN TIME: 10.8 SEC (ref 9–12.5)
RBC # BLD AUTO: 4.52 M/UL (ref 4.6–6.2)
SODIUM SERPL-SCNC: 135 MMOL/L (ref 136–145)
WBC # BLD AUTO: 7.46 K/UL (ref 3.9–12.7)

## 2023-04-02 PROCEDURE — 80053 COMPREHEN METABOLIC PANEL: CPT | Performed by: STUDENT IN AN ORGANIZED HEALTH CARE EDUCATION/TRAINING PROGRAM

## 2023-04-02 PROCEDURE — 84100 ASSAY OF PHOSPHORUS: CPT | Performed by: STUDENT IN AN ORGANIZED HEALTH CARE EDUCATION/TRAINING PROGRAM

## 2023-04-02 PROCEDURE — 85025 COMPLETE CBC W/AUTO DIFF WBC: CPT | Performed by: STUDENT IN AN ORGANIZED HEALTH CARE EDUCATION/TRAINING PROGRAM

## 2023-04-02 PROCEDURE — 85730 THROMBOPLASTIN TIME PARTIAL: CPT | Performed by: STUDENT IN AN ORGANIZED HEALTH CARE EDUCATION/TRAINING PROGRAM

## 2023-04-02 PROCEDURE — 25000003 PHARM REV CODE 250

## 2023-04-02 PROCEDURE — 36415 COLL VENOUS BLD VENIPUNCTURE: CPT | Performed by: STUDENT IN AN ORGANIZED HEALTH CARE EDUCATION/TRAINING PROGRAM

## 2023-04-02 PROCEDURE — 83735 ASSAY OF MAGNESIUM: CPT | Performed by: STUDENT IN AN ORGANIZED HEALTH CARE EDUCATION/TRAINING PROGRAM

## 2023-04-02 PROCEDURE — 99239 HOSP IP/OBS DSCHRG MGMT >30: CPT | Mod: ,,, | Performed by: PSYCHIATRY & NEUROLOGY

## 2023-04-02 PROCEDURE — 99239 PR HOSPITAL DISCHARGE DAY,>30 MIN: ICD-10-PCS | Mod: ,,, | Performed by: PSYCHIATRY & NEUROLOGY

## 2023-04-02 PROCEDURE — 85610 PROTHROMBIN TIME: CPT | Performed by: STUDENT IN AN ORGANIZED HEALTH CARE EDUCATION/TRAINING PROGRAM

## 2023-04-02 RX ADMIN — APIXABAN 5 MG: 5 TABLET, FILM COATED ORAL at 09:04

## 2023-04-02 RX ADMIN — MUPIROCIN: 20 OINTMENT TOPICAL at 09:04

## 2023-04-02 NOTE — HOSPITAL COURSE
Patient admitted to stroke team for further management. Initial CT head OSH showed transverse sinus thrombosis and possible R occipital IPH. MRI brain confirmed VST but showed possible saccular aneurysm vs vascular abnormality at R temporal lobe and after review by NCC and Vascular Neurology recommendations were to transfer here for DSA prior to initiatin anticoagulation for VST. DSA showed a right sigmoid sub-occlusive dural venous sinus filling defect and a right isolated venous varix along the superior tentorium. Patient placed on eliquis and will discharge home with instructions to follow up with vascular neurology in one month.

## 2023-04-02 NOTE — ASSESSMENT & PLAN NOTE
33 yo M w no significant PMH presenting as direct transfer from [facility] for transverse sinus thrombus and vascular abnormality seen on MRI 3/29/23. He refers a pressure like HA behind his eyes that started 10 days ago, with on exacerbating factors nor any neurological deficits such as diplopia, blurry vision, weakness, numbness. (see HPI for details) He denies any new medications or drug use. Initial CT head OSH showed transverse sinus thrombosis and possible R occipital IPH. MRI brain confirmed VST but showed possible saccular aneurysm vs vascular abnormality at R temporal lobe and after review by NCC and Vascular Neurology recommendations were to transfer here for DSA prior to initiatin anticoagulation for VST. On arrival patient is neuro intact, no headaches. DSA showed a right sigmoid sub-occlusive dural venous sinus filling defect and a right isolated venous varix along the superior tentorium. Patient placed on eliquis and will discharge home today.     Antithrombotics for secondary stroke prevention: Anticoagulants: Apixaban 5 mg BID     Statins for secondary stroke prevention and hyperlipidemia, if present:   Statins: None: Reason: no indication, no infarct    Aggressive risk factor modification: None     Rehab efforts: The patient has been evaluated by a stroke team provider and the therapy needs have been fully considered based off the presenting complaints and exam findings. The following therapy evaluations are needed: no need for evaluation at his baseline. Patient to discharge home today    Diagnostics ordered/pending: none    VTE prophylaxis: Heparin 5000 units SQ every 8 hours while IP

## 2023-04-02 NOTE — PROGRESS NOTES
[facility] - Neurosurgery ([facility])  Vascular Neurology  Comprehensive Stroke Center  Progress Note    Assessment/Plan:     * Transverse sinus thrombosis  31 yo M w no significant PMH presenting [place] for transverse sinus thrombus and vascular abnormality seen on MRI 3/29/23. He refers a pressure like HA behind his eyes that started 10 days ago, with on exacerbating factors nor any neurological deficits such as diplopia, blurry vision, weakness, numbness. (see HPI for details) He denies any new medications or drug use. Initial CT head OSH showed transverse sinus thrombosis and possible R occipital IPH. MRI brain confirmed VST but showed possible saccular aneurysm vs vascular abnormality at R temporal lobe and after review by NCC and Vascular Neurology recommendations were to transfer here for DSA prior to initiatin anticoagulation for VST. On arrival patient is neuro intact, no headaches. DSA showed a right sigmoid sub-occlusive dural venous sinus filling defect and a right isolated venous varix along the superior tentorium. Patient placed on eliquis and will discharge home today.     Antithrombotics for secondary stroke prevention: Anticoagulants: Apixaban 5 mg BID     Statins for secondary stroke prevention and hyperlipidemia, if present:   Statins: None: Reason: no indication, no infarct    Aggressive risk factor modification: None     Rehab efforts: The patient has been evaluated by a stroke team provider and the therapy needs have been fully considered based off the presenting complaints and exam findings. The following therapy evaluations are needed: no need for evaluation at his baseline. Patient to discharge home today    Diagnostics ordered/pending: none    VTE prophylaxis: Heparin 5000 units SQ every 8 hours while IP          Vascular abnormality of brain  -- See transverse sinus thrombosis          No notes on file    STROKE DOCUMENTATION        NIH Scale:  1a. Level of  Consciousness: 0-->Alert, keenly responsive  1b. LOC Questions: 0-->Answers both questions correctly  1c. LOC Commands: 0-->Performs both tasks correctly  2. Best Gaze: 0-->Normal  3. Visual: 0-->No visual loss  4. Facial Palsy: 0-->Normal symmetrical movements  5a. Motor Arm, Left: 0-->No drift, limb holds 90 (or 45) degrees for full 10 secs  5b. Motor Arm, Right: 0-->No drift, limb holds 90 (or 45) degrees for full 10 secs  6a. Motor Leg, Left: 0-->No drift, leg holds 30 degree position for full 5 secs  6b. Motor Leg, Right: 0-->No drift, leg holds 30 degree position for full 5 secs  7. Limb Ataxia: 0-->Absent  8. Sensory: 0-->Normal, no sensory loss  9. Best Language: 0-->No aphasia, normal  10. Dysarthria: 0-->Normal  11. Extinction and Inattention (formerly Neglect): 0-->No abnormality  Total (NIH Stroke Scale): 0       Modified Camden Score: 0  Agnes Coma Scale:15   ABCD2 Score:    [id number] Score:   HAS -BLED Score:   ICH Score:   Hunt & Navas Classification:      Hemorrhagic change of an Ischemic Stroke: Does this patient have an ischemic stroke with hemorrhagic changes? No     Neurologic Chief Complaint: no complaints    Subjective:     Interval History: Patient is seen for follow-up neurological assessment and treatment recommendations: patient to be discharged today with eliquis and instructions to follow up with vascular neurology outpatient    HPI, Past Medical, Family, and Social History remains the same as documented in the initial encounter.     Review of Systems   Constitutional:  Negative for chills, fatigue and fever.   HENT:  Negative for ear pain and sinus pain.    Eyes:  Negative for photophobia, pain and visual disturbance.   Respiratory:  Negative for chest tightness, shortness of breath and wheezing.    Cardiovascular:  Negative for chest pain and leg swelling.   Gastrointestinal:  Negative for abdominal pain, blood in stool, constipation, diarrhea, nausea and vomiting.   Endocrine:  Negative for polydipsia and polyuria.   Genitourinary:  Negative for difficulty urinating, dysuria, flank pain, frequency, penile pain and testicular pain.   Musculoskeletal:  Negative for back pain, neck pain and neck stiffness.   Skin:  Negative for color change and rash.   Neurological:  Negative for dizziness, syncope, weakness, numbness and headaches.   Psychiatric/Behavioral:  Negative for confusion and sleep disturbance.    Scheduled Meds:   apixaban  5 mg Oral BID    mupirocin   Nasal BID     Continuous Infusions:  PRN Meds:acetaminophen, labetalol, sodium chloride 0.9%    Objective:     Vital Signs (Most Recent):  Temp: 98 °F (36.7 °C) (04/02/23 0541)  Pulse: 61 (04/02/23 1132)  Resp: 18 (04/02/23 0541)  BP: 106/62 (04/02/23 0541)  SpO2: 98 % (04/02/23 0541)  BP Location: Left arm    Vital Signs Range (Last 24H):  Temp:  [97.8 °F (36.6 °C)-98.4 °F (36.9 °C)]   Pulse:  [57-89]   Resp:  [13-20]   BP: (106-141)/(62-83)   SpO2:  [96 %-100 %]   BP Location: Left arm    Physical Exam  Vitals and nursing note reviewed.   Constitutional:       Appearance: Normal appearance.   HENT:      Head: Normocephalic and atraumatic.      Nose: Nose normal.      Mouth/Throat:      Mouth: Mucous membranes are moist.   Eyes:      Extraocular Movements: Extraocular movements intact.      Pupils: Pupils are equal, round, and reactive to light.   Cardiovascular:      Rate and Rhythm: Normal rate and regular rhythm.   Pulmonary:      Effort: No respiratory distress.   Abdominal:      General: Bowel sounds are normal. There is no distension.      Palpations: Abdomen is soft.      Tenderness: There is no abdominal tenderness.   Musculoskeletal:         General: No swelling. Normal range of motion.      Cervical back: Normal range of motion.   Skin:     General: Skin is warm.      Capillary Refill: Capillary refill takes less than 2 seconds.   Neurological:      General: No focal deficit present.      Mental Status: He is alert and  oriented to person, place, and time. Mental status is at baseline.       Neurological Exam:   LOC: alert  Attention Span: Good   Language: No aphasia  Articulation: No dysarthria  Orientation: Person, Place, Time   Visual Fields: Full  EOM (CN III, IV, VI): Full/intact  Facial Movement (CN VII): Symmetric facial expression    Motor: Arm left  Normal 5/5  Leg left  Normal 5/5  Arm right  Normal 5/5  Leg right Normal 5/5  Sensation: Intact to light touch, temperature and vibration    Laboratory:  CMP:   Recent Labs   Lab 04/02/23 0319   CALCIUM 8.9   ALBUMIN 3.9   PROT 6.7   *   K 3.9   CO2 23      BUN 19   CREATININE 0.9   ALKPHOS 44*   ALT 34   AST 24   BILITOT 0.5     CBC:   Recent Labs   Lab 04/02/23 0319   WBC 7.46   RBC 4.52*   HGB 13.6*   HCT 42.3      MCV 94   MCH 30.1   MCHC 32.2       Coagulation:   Recent Labs   Lab 04/02/23 0319   INR 1.0   APTT 30.1       Diagnostic Results        Brain imaging:  Brain MRI       Impression:     1. No acute infarct or hemorrhage.  2. Suspected vascular anomaly, possibly saccular aneurysm, in the medial right temporal lobe.  It is unclear whether this is entirely arterial in nature.  Circumscribed enhancing extra-axial neoplastic lesion considered unlikely.  3. Thrombus in the right transverse sinus     Vessel Imaging   IR angiogram carotid cerebral bilateral arch 4/1/2023  No arterial abnormalities are seen.  No AVM or arterial aneurysm.     Venous abnormalities are identified including tapering and near complete occlusion of the right transverse-sigmoid sinus junction.  This is identified on right carotid injections.     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  This has slow washout in the venous phase.  Cardiac Imaging   none      Goran Andino MD  [facility]  Department of Vascular Neurology   [facility] - Neurosurgery  ([facility])

## 2023-04-02 NOTE — PLAN OF CARE
Problem: Adult Inpatient Plan of Care  Goal: Plan of Care Review  Outcome: Ongoing, Progressing  Goal: Patient-Specific Goal (Individualized)  Outcome: Ongoing, Progressing  Goal: Absence of Hospital-Acquired Illness or Injury  Outcome: Ongoing, Progressing  Goal: Optimal Comfort and Wellbeing  Outcome: Ongoing, Progressing  Goal: Readiness for Transition of Care  Outcome: Ongoing, Progressing     Problem: Infection  Goal: Absence of Infection Signs and Symptoms  Outcome: Ongoing, Progressing     Problem: Adjustment to Illness (Stroke, Ischemic/Transient Ischemic Attack)  Goal: Optimal Coping  Outcome: Ongoing, Progressing     Problem: Bowel Elimination Impaired (Stroke, Ischemic/Transient Ischemic Attack)  Goal: Effective Bowel Elimination  Outcome: Ongoing, Progressing     Problem: Cerebral Tissue Perfusion (Stroke, Ischemic/Transient Ischemic Attack)  Goal: Optimal Cerebral Tissue Perfusion  Outcome: Ongoing, Progressing     Problem: Cognitive Impairment (Stroke, Ischemic/Transient Ischemic Attack)  Goal: Optimal Cognitive Function  Outcome: Ongoing, Progressing     Problem: Communication Impairment (Stroke, Ischemic/Transient Ischemic Attack)  Goal: Improved Communication Skills  Outcome: Ongoing, Progressing     Problem: Functional Ability Impaired (Stroke, Ischemic/Transient Ischemic Attack)  Goal: Optimal Functional Ability  Outcome: Ongoing, Progressing     Problem: Respiratory Compromise (Stroke, Ischemic/Transient Ischemic Attack)  Goal: Effective Oxygenation and Ventilation  Outcome: Ongoing, Progressing     Problem: Sensorimotor Impairment (Stroke, Ischemic/Transient Ischemic Attack)  Goal: Improved Sensorimotor Function  Outcome: Ongoing, Progressing     Problem: Swallowing Impairment (Stroke, Ischemic/Transient Ischemic Attack)  Goal: Optimal Eating and Swallowing without Aspiration  Outcome: Ongoing, Progressing     Problem: Urinary Elimination Impaired (Stroke, Ischemic/Transient Ischemic  Attack)  Goal: Effective Urinary Elimination  Outcome: Ongoing, Progressing  Patient with no complaints, no issues overnight, vitals stable

## 2023-04-02 NOTE — SUBJECTIVE & OBJECTIVE
Neurologic Chief Complaint: no complaints    Subjective:     Interval History: Patient is seen for follow-up neurological assessment and treatment recommendations: patient to be discharged today with eliquis and instructions to follow up with vascular neurology outpatient    HPI, Past Medical, Family, and Social History remains the same as documented in the initial encounter.     Review of Systems   Constitutional:  Negative for chills, fatigue and fever.   HENT:  Negative for ear pain and sinus pain.    Eyes:  Negative for photophobia, pain and visual disturbance.   Respiratory:  Negative for chest tightness, shortness of breath and wheezing.    Cardiovascular:  Negative for chest pain and leg swelling.   Gastrointestinal:  Negative for abdominal pain, blood in stool, constipation, diarrhea, nausea and vomiting.   Endocrine: Negative for polydipsia and polyuria.   Genitourinary:  Negative for difficulty urinating, dysuria, flank pain, frequency, penile pain and testicular pain.   Musculoskeletal:  Negative for back pain, neck pain and neck stiffness.   Skin:  Negative for color change and rash.   Neurological:  Negative for dizziness, syncope, weakness, numbness and headaches.   Psychiatric/Behavioral:  Negative for confusion and sleep disturbance.    Scheduled Meds:   apixaban  5 mg Oral BID    mupirocin   Nasal BID     Continuous Infusions:  PRN Meds:acetaminophen, labetalol, sodium chloride 0.9%    Objective:     Vital Signs (Most Recent):  Temp: 98 °F (36.7 °C) (04/02/23 0541)  Pulse: 61 (04/02/23 1132)  Resp: 18 (04/02/23 0541)  BP: 106/62 (04/02/23 0541)  SpO2: 98 % (04/02/23 0541)  BP Location: Left arm    Vital Signs Range (Last 24H):  Temp:  [97.8 °F (36.6 °C)-98.4 °F (36.9 °C)]   Pulse:  [57-89]   Resp:  [13-20]   BP: (106-141)/(62-83)   SpO2:  [96 %-100 %]   BP Location: Left arm    Physical Exam  Vitals and nursing note reviewed.   Constitutional:       Appearance: Normal appearance.   HENT:      Head:  Normocephalic and atraumatic.      Nose: Nose normal.      Mouth/Throat:      Mouth: Mucous membranes are moist.   Eyes:      Extraocular Movements: Extraocular movements intact.      Pupils: Pupils are equal, round, and reactive to light.   Cardiovascular:      Rate and Rhythm: Normal rate and regular rhythm.   Pulmonary:      Effort: No respiratory distress.   Abdominal:      General: Bowel sounds are normal. There is no distension.      Palpations: Abdomen is soft.      Tenderness: There is no abdominal tenderness.   Musculoskeletal:         General: No swelling. Normal range of motion.      Cervical back: Normal range of motion.   Skin:     General: Skin is warm.      Capillary Refill: Capillary refill takes less than 2 seconds.   Neurological:      General: No focal deficit present.      Mental Status: He is alert and oriented to person, place, and time. Mental status is at baseline.       Neurological Exam:   LOC: alert  Attention Span: Good   Language: No aphasia  Articulation: No dysarthria  Orientation: Person, Place, Time   Visual Fields: Full  EOM (CN III, IV, VI): Full/intact  Facial Movement (CN VII): Symmetric facial expression    Motor: Arm left  Normal 5/5  Leg left  Normal 5/5  Arm right  Normal 5/5  Leg right Normal 5/5  Sensation: Intact to light touch, temperature and vibration    Laboratory:  CMP:   Recent Labs   Lab 04/02/23 0319   CALCIUM 8.9   ALBUMIN 3.9   PROT 6.7   *   K 3.9   CO2 23      BUN 19   CREATININE 0.9   ALKPHOS 44*   ALT 34   AST 24   BILITOT 0.5     CBC:   Recent Labs   Lab 04/02/23 0319   WBC 7.46   RBC 4.52*   HGB 13.6*   HCT 42.3      MCV 94   MCH 30.1   MCHC 32.2       Coagulation:   Recent Labs   Lab 04/02/23 0319   INR 1.0   APTT 30.1       Diagnostic Results        Brain imaging:  Brain MRI       Impression:     1. No acute infarct or hemorrhage.  2. Suspected vascular anomaly, possibly saccular aneurysm, in the medial right temporal lobe.  It is  unclear whether this is entirely arterial in nature.  Circumscribed enhancing extra-axial neoplastic lesion considered unlikely.  3. Thrombus in the right transverse sinus     Vessel Imaging   IR angiogram carotid cerebral bilateral arch 4/1/2023  No arterial abnormalities are seen.  No AVM or arterial aneurysm.     Venous abnormalities are identified including tapering and near complete occlusion of the right transverse-sigmoid sinus junction.  This is identified on right carotid injections.     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  This has slow washout in the venous phase.  Cardiac Imaging   none

## 2023-04-02 NOTE — DISCHARGE SUMMARY
[facility] - Neurosurgery ([facility])  Vascular Neurology  Comprehensive Stroke Center  Discharge Summary     Summary:     Admit Date: 4/1/2023  7:28 AM    Discharge Date and Time:  04/02/2023 12:30 PM    Attending Physician: Kiersten Huitron MD     Discharge Provider: Goran Andino MD    History of Present Illness: 33 yo M w no significant PMH presenting as direct transfer to vascular neurology for R transverse sinus thrombus and vascular abnormality seen on MRI 3/29/23. Patient initially presented to [facility] after he was told he had abnrmal findings on  CT head he had done this same day for an ongoing Headache. he refers that last Thursday 3/23/23 he developed a progressive tension like headache with pressure like symptoms behind his eyes. He does not usually suffer for headaches and he took Tylenol with minimal relief. He continued to have these during the day time, he describes them as sinus like, he denies any pain with EOM, diplopia, blurry vision, exacerbation with leaning forward, no focal weakness, numbness or gate instability. He went to an urgent care who recommended and CT head which he had done and showed per report a transverse sinus thrombosis and possible hemorrhage at R occipital lobe and he was called and asked to go the the ED for further evaluation. At the ED NCC called who recommended MRI brain that showed vascular abnormality by the R temporal lobe in addition of already mentioned VST. Dr. Huitron was called and recommended [place] [facility] [place] further evaluation by IR and possibly DSA prior to starting anti coagulation given potential bleeding risk.   [place] [facility] [place] any headaches or other symptoms, no neuro deficits on exam. NIHSS 0.    Patient denies any history of blood clots, DVT/PE, he denies any new medications other than a one time dose of clonidine during his headaches for an elevated blood pressure. There is family history of intracranial blood clots and PE, as well as  abdominal aneurysm. He is a former smoker (quit 5 years ago) and denies any drug use.       Hospital Course (synopsis of major diagnoses, care, treatment, and services provided during the course of the hospital stay): Patient admitted to stroke team for further management. Initial CT head OSH showed transverse sinus thrombosis and possible R occipital IPH. MRI brain confirmed VST but showed possible saccular aneurysm vs vascular abnormality at R temporal lobe and after review by NCC and Vascular Neurology recommendations were to transfer here for DSA prior to initiatin anticoagulation for VST. DSA showed a right sigmoid sub-occlusive dural venous sinus filling defect and a right isolated venous varix along the superior tentorium. Patient placed on eliquis and will discharge home with instructions to follow up with vascular neurology in one month.          Goals of Care Treatment Preferences:  Code Status: Full Code      Stroke Etiology: NA    STROKE DOCUMENTATION         NIH Scale:  1a. Level of Consciousness: 0-->Alert, keenly responsive  1b. LOC Questions: 0-->Answers both questions correctly  1c. LOC Commands: 0-->Performs both tasks correctly  2. Best Gaze: 0-->Normal  3. Visual: 0-->No visual loss  4. Facial Palsy: 0-->Normal symmetrical movements  5a. Motor Arm, Left: 0-->No drift, limb holds 90 (or 45) degrees for full 10 secs  5b. Motor Arm, Right: 0-->No drift, limb holds 90 (or 45) degrees for full 10 secs  6a. Motor Leg, Left: 0-->No drift, leg holds 30 degree position for full 5 secs  6b. Motor Leg, Right: 0-->No drift, leg holds 30 degree position for full 5 secs  7. Limb Ataxia: 0-->Absent  8. Sensory: 0-->Normal, no sensory loss  9. Best Language: 0-->No aphasia, normal  10. Dysarthria: 0-->Normal  11. Extinction and Inattention (formerly Neglect): 0-->No abnormality  Total (NIH Stroke Scale): 0        Modified Morton Score: 0  Huntington Coma Scale:15   ABCD2 Score:    [id number] Score:   HAS -BLED  Score:   ICH Score:   Hunt & Navas Classification:       Assessment/Plan:     Diagnostic Results:      Brain imaging:  Brain MRI       Impression:     1. No acute infarct or hemorrhage.  2. Suspected vascular anomaly, possibly saccular aneurysm, in the medial right temporal lobe.  It is unclear whether this is entirely arterial in nature.  Circumscribed enhancing extra-axial neoplastic lesion considered unlikely.  3. Thrombus in the right transverse sinus     Vessel Imaging   IR angiogram carotid cerebral bilateral arch 4/1/2023  No arterial abnormalities are seen.  No AVM or arterial aneurysm.     Venous abnormalities are identified including tapering and near complete occlusion of the right transverse-sigmoid sinus junction.  This is identified on right carotid injections.     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  This has slow washout in the venous phase.  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  Service: Vascular Neurology      Recommendations:     Post-discharge complication risks: None    Stroke Education given to: patient    Follow-up in Stroke Clinic in 4-6 weeks.     Discharge Plan:  Anticoagulant: Apixaban 5mg    Follow Up:      Patient Instructions:      Ambulatory referral/consult to Vascular Neurology   Standing Status: Future   Referral Priority: Routine Referral Type: Consultation   Referral Reason: Specialty Services Required   Requested Specialty: Vascular  Neurology   Number of Visits Requested: 1     Diet Cardiac   Order Comments: See Stroke Patient Education Guide Booklet for details.     Call 911 for any of the following:   Order Comments: Call 911  right away if any of the following warning signs come on suddenly, even if the symptoms only last for a few minutes. With stroke, timing is very important.   - Warning Signs of Stroke:  - Weakness: You may feel a sudden weakness, tingling or loss of feeling on one side of your face or body.  - Vision Problems: You may have sudden double vision or trouble seeing in one or both eyes.  - Speech Problems: You may have sudden trouble talking, slured speech, or problems understanding others.  - Headache: You may have sudden, severe headache.  - Movement Problems: You may experience dizziness, a feeling of spinning, a loss of balance, a feeling of falling or blackouts.       Medications:  Reconciled Home Medications:      Medication List      START taking these medications    apixaban 5 mg Tab  Commonly known as: ELIQUIS  Take 1 tablet (5 mg total) by mouth 2 (two) times daily.        CONTINUE taking these medications    benzonatate 200 MG capsule  Commonly known as: TESSALON  Take 1 capsule (200 mg total) by mouth 3 (three) times daily as needed for Cough.     MEN'S MULTIVITAMIN ORAL  Take 1 tablet by mouth. 3-4 times a week     ZzzQuiL 25 mg capsule  Generic drug: diphenhydrAMINE  Take 25 mg by mouth nightly as needed (sleep).            Goran Andino MD  Comprehensive Stroke Center  Department of Vascular Neurology   [facility] - Neurosurgery [facility])

## 2023-04-02 NOTE — PLAN OF CARE
04/02/23 1210   Final Note   Assessment Type Final Discharge Note   Anticipated Discharge Disposition Home   What phone number can be called within the next 1-3 days to see how you are doing after discharge? [phone]   Post-Acute Status   Discharge Delays None known at this time

## 2023-04-02 NOTE — PLAN OF CARE
Problem: Adult Inpatient Plan of Care  Goal: Plan of Care Review  Outcome: Ongoing, Progressing  Goal: Absence of Hospital-Acquired Illness or Injury  Outcome: Ongoing, Progressing  Goal: Optimal Comfort and Wellbeing  Outcome: Ongoing, Progressing     Problem: Infection  Goal: Absence of Infection Signs and Symptoms  Outcome: Ongoing, Progressing     Problem: Cerebral Tissue Perfusion (Stroke, Ischemic/Transient Ischemic Attack)  Goal: Optimal Cerebral Tissue Perfusion  Outcome: Ongoing, Progressing

## 2023-04-04 ENCOUNTER — PATIENT OUTREACH (OUTPATIENT)
Dept: ADMINISTRATIVE | Facility: CLINIC | Age: 33
End: 2023-04-04
Payer: OTHER MISCELLANEOUS

## 2023-04-04 ENCOUNTER — TELEPHONE (OUTPATIENT)
Dept: NEUROSURGERY | Facility: CLINIC | Age: 33
End: 2023-04-04
Payer: OTHER MISCELLANEOUS

## 2023-04-04 ENCOUNTER — TELEPHONE (OUTPATIENT)
Dept: NEUROLOGY | Facility: HOSPITAL | Age: 33
End: 2023-04-04
Payer: OTHER MISCELLANEOUS

## 2023-04-04 NOTE — PROGRESS NOTES
C3 nurse spoke with Samir Farias  for a TCC post hospital discharge follow up call. The patient reports does not have a scheduled HOSFU appointment. C3 nurse was unable to schedule HOSFU appointment for [facility] PCP. Patient advised to contact their PCP to schedule a HOSPFU within 5-7 days. No PCP.

## 2023-04-04 NOTE — TELEPHONE ENCOUNTER
----- Message from Ivonne Ordonez RN sent at 4/3/2023  2:44 PM CDT -----    ----- Message -----  From: Ty Parker MD  Sent: 4/1/2023   3:39 PM CDT  To: Martha SHERIDAN Staff    Please have this pt follow up in outpatient clinic in 2 week with dr hany lim isolated venous varix with no AVF or AVM

## 2023-04-06 NOTE — TELEPHONE ENCOUNTER
Verbalized understanding of discharge instructions and medications. Follow up appointment discussed. Warning signs were discussed  Instructed to seek medical help via 911 if new symptoms occur. Relayed no new questions or comments at this time.

## 2023-04-12 ENCOUNTER — HOME CARE VISIT (OUTPATIENT)
Dept: NEUROLOGY | Facility: HOSPITAL | Age: 33
End: 2023-04-12
Payer: COMMERCIAL

## 2023-04-12 VITALS
HEART RATE: 72 BPM | SYSTOLIC BLOOD PRESSURE: 120 MMHG | RESPIRATION RATE: 20 BRPM | OXYGEN SATURATION: 98 % | DIASTOLIC BLOOD PRESSURE: 78 MMHG

## 2023-04-12 NOTE — PROGRESS NOTES
Mr. Farias doing well at home with his twin 2 yr olds sons, very pleasant and calm gentleman high functioning mentally and physically. No complaint of headaches, dizziness or nausea, eating/swallowing with no difficulty. States started drinking more water and proactive in recurrent stroke prevention. Inquired about next follow up appnt. and MRI.

## 2023-04-13 ENCOUNTER — TELEPHONE (OUTPATIENT)
Dept: NEUROLOGY | Facility: CLINIC | Age: 33
End: 2023-04-13
Payer: OTHER MISCELLANEOUS

## 2023-04-13 NOTE — TELEPHONE ENCOUNTER
Called pt to schedule HOF. Spoke w pt and scheduled appt for 4/19 at 9am with Dr. Banks. Pt confirmed date/time of appt.

## 2023-04-13 NOTE — TELEPHONE ENCOUNTER
----- Message from Jeromy Denny RN sent at 4/12/2023  2:06 PM CDT -----  Regarding: Follow up appnt  Vanesa this is Saleem with [facility], visited this patient today and he was inquiring about his follow up appnt. Can you assist with this please.     Thanks Saleem  [phone]

## 2023-04-25 ENCOUNTER — TELEPHONE (OUTPATIENT)
Dept: NEUROSURGERY | Facility: CLINIC | Age: 33
End: 2023-04-25

## 2023-04-25 ENCOUNTER — OFFICE VISIT (OUTPATIENT)
Dept: NEUROLOGY | Facility: CLINIC | Age: 33
End: 2023-04-25
Payer: COMMERCIAL

## 2023-04-25 ENCOUNTER — OFFICE VISIT (OUTPATIENT)
Dept: NEUROSURGERY | Facility: CLINIC | Age: 33
End: 2023-04-25
Payer: COMMERCIAL

## 2023-04-25 VITALS
HEIGHT: 69 IN | HEART RATE: 57 BPM | BODY MASS INDEX: 35.49 KG/M2 | WEIGHT: 239.63 LBS | SYSTOLIC BLOOD PRESSURE: 108 MMHG | DIASTOLIC BLOOD PRESSURE: 75 MMHG

## 2023-04-25 DIAGNOSIS — R51.9 NONINTRACTABLE HEADACHE, UNSPECIFIED CHRONICITY PATTERN, UNSPECIFIED HEADACHE TYPE: ICD-10-CM

## 2023-04-25 DIAGNOSIS — G08 ACUTE CEREBRAL VENOUS SINUS THROMBOSIS: ICD-10-CM

## 2023-04-25 DIAGNOSIS — Q27.8 CONGENITAL VENOUS VARIX: Primary | ICD-10-CM

## 2023-04-25 DIAGNOSIS — G08 TRANSVERSE SINUS THROMBOSIS: Primary | ICD-10-CM

## 2023-04-25 PROCEDURE — 99215 PR OFFICE/OUTPT VISIT, EST, LEVL V, 40-54 MIN: ICD-10-PCS | Mod: 95,,, | Performed by: NEUROLOGICAL SURGERY

## 2023-04-25 PROCEDURE — 1160F RVW MEDS BY RX/DR IN RCRD: CPT | Mod: CPTII,95,, | Performed by: NEUROLOGICAL SURGERY

## 2023-04-25 PROCEDURE — 3008F BODY MASS INDEX DOCD: CPT | Mod: CPTII,S$GLB,, | Performed by: NURSE PRACTITIONER

## 2023-04-25 PROCEDURE — 99215 PR OFFICE/OUTPT VISIT, EST, LEVL V, 40-54 MIN: ICD-10-PCS | Mod: S$GLB,,, | Performed by: NURSE PRACTITIONER

## 2023-04-25 PROCEDURE — 99999 PR PBB SHADOW E&M-EST. PATIENT-LVL IV: CPT | Mod: PBBFAC,,, | Performed by: NURSE PRACTITIONER

## 2023-04-25 PROCEDURE — 1111F DSCHRG MED/CURRENT MED MERGE: CPT | Mod: CPTII,95,, | Performed by: NEUROLOGICAL SURGERY

## 2023-04-25 PROCEDURE — 99215 OFFICE O/P EST HI 40 MIN: CPT | Mod: S$GLB,,, | Performed by: NURSE PRACTITIONER

## 2023-04-25 PROCEDURE — 1160F PR REVIEW ALL MEDS BY PRESCRIBER/CLIN PHARMACIST DOCUMENTED: ICD-10-PCS | Mod: CPTII,S$GLB,, | Performed by: NURSE PRACTITIONER

## 2023-04-25 PROCEDURE — 1160F RVW MEDS BY RX/DR IN RCRD: CPT | Mod: CPTII,S$GLB,, | Performed by: NURSE PRACTITIONER

## 2023-04-25 PROCEDURE — 1160F PR REVIEW ALL MEDS BY PRESCRIBER/CLIN PHARMACIST DOCUMENTED: ICD-10-PCS | Mod: CPTII,95,, | Performed by: NEUROLOGICAL SURGERY

## 2023-04-25 PROCEDURE — 1111F PR DISCHARGE MEDS RECONCILED W/ CURRENT OUTPATIENT MED LIST: ICD-10-PCS | Mod: CPTII,95,, | Performed by: NEUROLOGICAL SURGERY

## 2023-04-25 PROCEDURE — 1159F PR MEDICATION LIST DOCUMENTED IN MEDICAL RECORD: ICD-10-PCS | Mod: CPTII,S$GLB,, | Performed by: NURSE PRACTITIONER

## 2023-04-25 PROCEDURE — 1111F DSCHRG MED/CURRENT MED MERGE: CPT | Mod: CPTII,S$GLB,, | Performed by: NURSE PRACTITIONER

## 2023-04-25 PROCEDURE — 3078F PR MOST RECENT DIASTOLIC BLOOD PRESSURE < 80 MM HG: ICD-10-PCS | Mod: CPTII,S$GLB,, | Performed by: NURSE PRACTITIONER

## 2023-04-25 PROCEDURE — 3074F SYST BP LT 130 MM HG: CPT | Mod: CPTII,S$GLB,, | Performed by: NURSE PRACTITIONER

## 2023-04-25 PROCEDURE — 3008F PR BODY MASS INDEX (BMI) DOCUMENTED: ICD-10-PCS | Mod: CPTII,S$GLB,, | Performed by: NURSE PRACTITIONER

## 2023-04-25 PROCEDURE — 3074F PR MOST RECENT SYSTOLIC BLOOD PRESSURE < 130 MM HG: ICD-10-PCS | Mod: CPTII,S$GLB,, | Performed by: NURSE PRACTITIONER

## 2023-04-25 PROCEDURE — 99999 PR PBB SHADOW E&M-EST. PATIENT-LVL IV: ICD-10-PCS | Mod: PBBFAC,,, | Performed by: NURSE PRACTITIONER

## 2023-04-25 PROCEDURE — 1111F PR DISCHARGE MEDS RECONCILED W/ CURRENT OUTPATIENT MED LIST: ICD-10-PCS | Mod: CPTII,S$GLB,, | Performed by: NURSE PRACTITIONER

## 2023-04-25 PROCEDURE — 3078F DIAST BP <80 MM HG: CPT | Mod: CPTII,S$GLB,, | Performed by: NURSE PRACTITIONER

## 2023-04-25 PROCEDURE — 1159F MED LIST DOCD IN RCRD: CPT | Mod: CPTII,95,, | Performed by: NEUROLOGICAL SURGERY

## 2023-04-25 PROCEDURE — 99215 OFFICE O/P EST HI 40 MIN: CPT | Mod: 95,,, | Performed by: NEUROLOGICAL SURGERY

## 2023-04-25 PROCEDURE — 1159F PR MEDICATION LIST DOCUMENTED IN MEDICAL RECORD: ICD-10-PCS | Mod: CPTII,95,, | Performed by: NEUROLOGICAL SURGERY

## 2023-04-25 PROCEDURE — 1159F MED LIST DOCD IN RCRD: CPT | Mod: CPTII,S$GLB,, | Performed by: NURSE PRACTITIONER

## 2023-04-25 NOTE — PROGRESS NOTES
Neurosurgery  History & Physical    SUBJECTIVE:     Chief Complaint:  Venous varix    History of Present Illness:  Patient is a 32-year-old man who previously presented concerning for a right transverse sinus thrombosis.  He presented to an outside facility with complaints of ongoing headache.  He had some progressive tension like headaches with some pressure with symptoms behind the eyes.  Any note that he typically does not have headaches.  He would taken over-the-counter analgesics (Tylenol), with minimal relief.  At the time he presented he did not have any sinus type injuries there was no pain with extraocular movement.  There was no diplopia no blurry vision in no exacerbation with leaning forward or focal weakness.  CT scan was concerning that there was thrombosis in the sinus verses possible hemorrhage in the right occipital lobe.  He was transferred to [facility], and was noted to have a venous varix at the right transverse sinus.  He notes today that he denies any new headache, nausea, vomiting.  He has been treated with Eliquis at the this is a virtual audio visual visit moment and his scheduled follow-up.  He is here to establish care with Neurosurgery.    Review of patient's allergies indicates:   Allergen Reactions    Ceclor [cefaclor]        Current Outpatient Medications   Medication Sig Dispense Refill    apixaban (ELIQUIS) 5 mg Tab Take 1 tablet (5 mg total) by mouth 2 (two) times daily. 60 tablet 2    benzonatate (TESSALON) 200 MG capsule Take 1 capsule (200 mg total) by mouth 3 (three) times daily as needed for Cough. (Patient not taking: Reported on 4/4/2023) 20 capsule 0    diphenhydrAMINE (BENADRYL) 25 mg capsule Take 25 mg by mouth nightly as needed (sleep).      multivit-min/folic/vit K/lycop (MEN'S MULTIVITAMIN ORAL) Take 1 tablet by mouth. 3-4 times a week       No current facility-administered medications for this visit.       No past medical history on file.  No past  surgical history on file.  Family History       Problem Relation (Age of Onset)    No Known Problems Mother, Father          Social History     Socioeconomic History    Marital status: Single   Tobacco Use    Smoking status: Former     Types: Cigars     Quit date: 2023     Years since quittin.0    Smokeless tobacco: Never   Substance and Sexual Activity    Alcohol use: No    Drug use: No    Sexual activity: Yes     Social Determinants of Health     Financial Resource Strain: Low Risk     Difficulty of Paying Living Expenses: Not hard at all   Food Insecurity: No Food Insecurity    Worried About Running Out of Food in the Last Year: Never true    Ran Out of Food in the Last Year: Never true   Transportation Needs: No Transportation Needs    Lack of Transportation (Medical): No    Lack of Transportation (Non-Medical): No   Physical Activity: Unknown    Days of Exercise per Week: Patient refused    Minutes of Exercise per Session: Patient refused   Stress: Unknown    Feeling of Stress : Patient refused   Social Connections: Unknown    Frequency of Communication with Friends and Family: Patient refused    Frequency of Social Gatherings with Friends and Family: Patient refused    Attends Church Services: Patient refused    Active Member of Clubs or Organizations: No    Attends Club or Organization Meetings: Patient refused    Marital Status: Patient refused   Housing Stability: Unknown    Unstable Housing in the Last Year: No       Review of Systems    OBJECTIVE:     Vital Signs     There is no height or weight on file to calculate BMI.      Neurosurgery Physical Exam  On virtual audio visual visit   Head is normocephalic atraumatic   Neck is grossly supple  No appreciable labored breathing   Admitting appears to be nontender   Patient is able to move extremities     On virtual audio visual visit the patient's speech is fluent, goal directed without any noted dysarthria or aphasia   Unable to evaluate pupils  on virtual audio visual visit   Face is symmetric   Tongue is midline  Unable to evaluate palate   Hearing appears to be intact on virtual audio visual visit to voice   Patient able to move both upper and lower extremities without any gross motor asymmetry on virtual audio visual visit     Diagnostic Results:  I personally reviewed patient's Diagnostic Imaging.  Diagnostic cerebral angiogram with flow stasis and a focal outpouching at the right transverse sinus, consistent with the known venous varix.  MRI of the brain with without contrast with some evidence of thrombosis dural sinuses.  Also recapitulated the venous varix seen on the angiogram.    ASSESSMENT/PLAN:     32-year-old man, previously presented with headache, venous varix of the right transverse sinus    1. No significant change in clinical exam on virtual audio visual visit     2. Cerebral angiogram with the venous varix, and flow stasis at the right transverse sinus.      3. Had long discussion with the patient.  Would recommend repeat follow-up imaging MRI of the brain with without contrast.  Continued follow-up with Neurology, continued anticoagulation per his neurologist.  Answered all the patient's questions satisfaction.      Thank you so very much for allowing me to participate in the care of this patient.  Please feel free to call any questions, comments, or concerns.    Evelina Thomas MD,MSc  Department of Neurosurgery   Department of Radiology  Department of Neurology  [facility]    [facility]   [employer] [facility] [employer]    Virtual visit Attestation   The patient location is: Home  The chief complaint leading to consultation is:  Venous varix  Visit type: audiovisual  Total time spent with patient: 40 min     Each patient to whom he or she provides medical services by telemedicine is:  (1) informed of the relationship  between the physician and patient and the respective role of any other health care provider with respect to management of the patient; and (2) notified that he or she may decline to receive medical services by telemedicine and may withdraw from such care at any time.     Note dictated with voice recognition software, please excuse any grammatical errors.

## 2023-04-25 NOTE — LETTER
April 25, 2023      [facility] - Neurology [address]th Fl  [address]  [facility] [address]  Phone: [phone]  Fax: [phone]       Patient: Samir Farias   YOB: 1990  Date of Visit: 04/25/2023    To Whom It May Concern:    María Farias  was at [facility]  may return to work/school on 05/1/23 with no restrictions. If you have any questions or concerns, or if I can be of further assistance, please do not hesitate to contact me.      Sincerely,    Briseida Briones, NP

## 2023-04-25 NOTE — PROGRESS NOTES
[facility] VISIT      SUBJECTIVE:    History for Present Illness: Samir Farias is a 32 y.o.  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  CTA head indicated a transverse sinus thrombus and possible hemorrhage at the right occipital lobe.  MRI of the brain confirmed VSD and possible saccular aneurysm versus abnormality at the right temporal lobe.  Patient is accompanied to today's visit by his mother.    At the time of today's visit, the patient denies new or worsening focal neurologic symptoms concerning for new stroke or TIA.  Patient reports complete resolution of headaches following starting Eliquis. He denies associated nausea, vomiting, HA ,vertigo, double vision, focal weakness or numbness, gait imbalance,  language or visual disturbances.  He is independent with ADLs.  He denies alcohol/tobacco/illicit drug use.  Patient is compliant with home medications including Eliquis.        History reviewed. No pertinent past medical history.  History reviewed. No pertinent surgical history.  Family History   Problem Relation Age of Onset    No Known Problems Mother     No Known Problems Father         Current Outpatient Medications:     apixaban (ELIQUIS) 5 mg Tab, Take 1 tablet (5 mg total) by mouth 2 (two) times daily., Disp: 60 tablet, Rfl: 2    diphenhydrAMINE (BENADRYL) 25 mg capsule, Take 25 mg by mouth nightly as needed (sleep)., Disp: , Rfl:     multivit-min/folic/vit K/lycop (MEN'S MULTIVITAMIN ORAL), Take 1 tablet by mouth. 3-4 times a week, Disp: , Rfl:     benzonatate (TESSALON) 200 MG capsule, Take 1 capsule (200 mg total) by mouth 3 (three) times daily as needed for Cough., Disp: 20 capsule, Rfl: 0     Review of Systems:   Constitutional:  Negative for chills and fever.   HENT:  Negative for  "sore throat.    Eyes:  Negative visual disturbance. Negative for photophobia, pain and redness.   Respiratory:  Negative for shortness of breath.    Cardiovascular:  Negative for chest pain.   Gastrointestinal:  Negative for nausea.   Genitourinary:  Negative for dysuria.   Musculoskeletal:  Negative for back pain.   Skin:  Negative for rash.   Neurological:  Negative for weakness.   Hematological:  Does not bruise/bleed easily.     OBJECTIVE:    /75   Pulse (!) 57   Ht 5' 9" (1.753 m)   Wt 108.7 kg (239 lb 10.2 oz)   BMI 35.39 kg/m²     Physical Exam   Constitution: He appears well nourished. No distress   LOC: Alert and follows request  Head: Normocephalic and atraumatic.   Cardiovascular: Normal rate. Intact distal pulses  Pulmonary/Chest: Effort normal. No respiratory distress  Psychiatric: no pressured speech; normal affect; no evidence of impaired cognition    Neurologic Exam:  Orientation: person, place and time  Language: No aphasia  Speech: No dysarthria  Memory: Recent memory intact; Remote memory intact; age correct; month correct  Visual Fields (CN II):  Full  EOM (CN III, IV, VI): Full intact  Pupils (CN II, III): PERRL  Facial Sensation (CN V): symmetric  Facial Movement (CN VII): Symmetrical facial expressions   Hearing (CN VIII):  Intact bilaterally   Shoulder/Neck (CN XI): SCM-Left: Normal; SCM-Right: Normal; Left Shoulder Shrug: Normal/Symmetric ; Right Shoulder Shrug: Normal/Symmetric  Tongue (CN XII): to midline  Motor examination of all extremities :demonstrates normal bulk and tone in all four limbs. There are no atrophy or fasciculations.        Left Right     Left Right   Deltoid 5/5 5/5   Hip Flexion 5/5 5/5   Biceps 5/5 5/5   Hip Extension 5/5 5/5   Triceps 5/5 5/5   Knee Flexion 5/5 5/5   Wrist Ext 5/5 5/5   Knee Extension 5/5 5/5   Finger Abd 5/5 5/5   Ankle dorsiflex 5/5 5/5           Ankle plantar flex 5/5 5/5     Sensory examination: is normal light touch in BUE and BLE.  " Romberg is negative.  Deep tendon reflexes :are 2+ and symmetric in the upper and lower extremities bilaterally.    Gait: Gait steady with normal arm swing and stride length  Coordination: No dysmetria with finger-to-nose . Rapid alternating movements and fine finger movements are intact.                                                                                                                                                                                          Relevant Labwork:        Diagnostic Results:  Imaging was independently reviewed by myself, along with the associated radiology report    Brain Imaging   MRI of the brain:  No acute infarcts or hemorrhage  Suspected vascular anomaly, possibly saccular aneurysm, in the medial right temporal lobe.  Thrombus in the right transverse sinus    Vessel Imaging   IR angiogram 04/01/2023:  No arterial abnormalities are seen.  No AVM or arterial aneurysm.     Venous abnormalities are identified including tapering and near complete occlusion of the right transverse-sigmoid sinus junction.  This is identified on right carotid injections.     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  This has slow washout in the venous phase.    Cardiac Imaging     Assessment:  Samir Farias  is a 32 y.o.  male  seen today in clinic for follow-up assessment and recommendations. The following recommendations and plan were discussed in depth with the patient who voiced understanding and was in agreement.  Plan:  Abnormal imaging finding- Thrombus in the right transverse sinus  --In-depth discussion with patient regarding diagnosis & imaging findings    -- Antithrombotics/ Anticoagulation:  Continue Eliquis 5 mg b.i.d. we will assess the need for long-term AC therapy following repeat MRI in 3 months  - Diagnostics ordered/pending:  Plan for repeat MRI brain with and without in 3  months to reassess thrombus in the right transverse sinus  -Diet: Discussed Mediterranean Diet recommendations (Adopted from Moshe et al, [facility], 2018.)  - Eat primarily plant-based foods, such as fruits and vegetables, whole grains, legumes (beans) and nuts  - Limit refined carbohydrates (white pasta, bread, rice).  - Replace butter with healthy fats such as olive oil.  - Use herbs and spices instead of salt to flavor foods.  - Limit red meat and processed meats to no more than a few times a month.  - Avoid sugary sodas, bakery goods, and sweets.  - Eat fish and poultry at least twice a week.              - Get plenty of exercise (150 minutes per week).    Patient/Family teaching provided during this visit  -Identifying the signs and symptoms of stroke; emergency action and ER attention  -Risk factor control  -Optimization for secondary stroke prevention including compliance with current medications       I will plan on having Samir return to in 3 months following repeat MRI. The patient can contact my office with any questions or concerns they may have as they arise in the interim.     45 minutes of total time spent on the encounter, which includes face to face time and non-face to face time preparing to see the patient (eg, review of tests), Obtaining and/or reviewing separately obtained history, Documenting clinical information in the electronic or other health record, Independently interpreting results (not separately reported) and communicating results to the patient/family/caregiver, patient/family education and Care coordination (not separately reported).     Briseida Broines NP-C  Department of Vascular Neurology  [facility]  [phone]    This note is dictated on M*Modal Fluency Direct word recognition program. There are word recognition mistakes that are occasionally missed on review

## 2023-05-23 ENCOUNTER — TELEPHONE (OUTPATIENT)
Dept: NEUROLOGY | Facility: CLINIC | Age: 33
End: 2023-05-23
Payer: COMMERCIAL

## 2023-05-23 NOTE — TELEPHONE ENCOUNTER
----- Message from Janet Clay sent at 5/23/2023 10:47 AM CDT -----  Regarding: JURY DUTY LETTER  Contact: [phone]  Calling to speak with provider or nurse to get a letter for jury duty exclusion due to condition. Please call patient today and send letter to e-mail [email or web address]. Patient needs letter today or tomorrow.

## 2023-05-25 ENCOUNTER — TELEPHONE (OUTPATIENT)
Dept: NEUROLOGY | Facility: CLINIC | Age: 33
End: 2023-05-25
Payer: COMMERCIAL

## 2023-05-25 NOTE — TELEPHONE ENCOUNTER
Called pt and explained that Briseida Briones is not going to provide excuse from jury duty, since she does not find that he is unable to go. Pt was somewhat displeased, and stated that he needs to be at his job in order to pay bills and cannot attend jury duty. I apologized, and emphasized that from strictly a medical perspective, Briseida believes pt is capable of performing jury duty responsibilities. Pt verbalized understanding and I suggestion speaking with job.

## 2023-05-25 NOTE — TELEPHONE ENCOUNTER
----- Message from Damon Lenz sent at 5/25/2023 10:31 AM CDT -----  Regarding: Call Back/ Jury Duty  Regarding: JURY DUTY LETTER  Contact: [phone]  Calling to speak with provider or nurse to get a letter for jury duty exclusion due to condition. Please call patient today and send letter to e-mail [email or web address]. Patient needs letter today or tomorrow

## 2023-06-07 ENCOUNTER — PATIENT MESSAGE (OUTPATIENT)
Dept: NEUROLOGY | Facility: CLINIC | Age: 33
End: 2023-06-07
Payer: COMMERCIAL

## 2023-06-29 ENCOUNTER — TELEPHONE (OUTPATIENT)
Dept: INTERNAL MEDICINE | Facility: CLINIC | Age: 33
End: 2023-06-29
Payer: COMMERCIAL

## 2023-06-29 RX ORDER — APIXABAN 5 MG/1
5 TABLET, FILM COATED ORAL 2 TIMES DAILY
Qty: 60 TABLET | Refills: 2 | Status: CANCELLED | OUTPATIENT
Start: 2023-06-29 | End: 2023-09-27

## 2023-06-29 NOTE — TELEPHONE ENCOUNTER
explained will adam to contact primary doctor   Was not seen in resident clinic   States understands

## 2023-06-29 NOTE — TELEPHONE ENCOUNTER
----- Message from Lilly Roque sent at 6/29/2023  2:20 PM CDT -----  Contact: [phone]  Requesting an RX refill or new RX.  Is this a refill or new RX: refill  RX name and strength (copy/paste from chart):  apixaban (ELIQUIS) 5 mg Tab  Is this a 30 day or 90 day RX: 30  Pharmacy name and phone # (copy/paste from chart):    [facility]   [address] [place] [address]  [phone]     The doctors have asked that we provide their patients with the following 2 reminders -- prescription refills can take up to 72 hours, and a friendly reminder that in the future you can use your [facility] account to request refills: yes

## 2023-06-30 ENCOUNTER — TELEPHONE (OUTPATIENT)
Dept: NEUROLOGY | Facility: CLINIC | Age: 33
End: 2023-06-30
Payer: COMMERCIAL

## 2023-06-30 ENCOUNTER — PATIENT MESSAGE (OUTPATIENT)
Dept: NEUROLOGY | Facility: CLINIC | Age: 33
End: 2023-06-30
Payer: COMMERCIAL

## 2023-06-30 NOTE — TELEPHONE ENCOUNTER
----- Message from Natacha Guzman sent at 6/30/2023  2:52 PM CDT -----  Contact: Self/[phone]  Requesting an RX refill or new RX.  Is this a refill or new RX: New  RX name and strength :  apixaban (ELIQUIS) 5 mg Tab  Is this a 30 day or 90 day RX: 30  Pharmacy name and phone # : [id number] [address]  [address] [address]  Phone: [phone] Fax: [phone]      The doctors have asked that we provide their patients with the following 2 reminders -- prescription refills can take up to 72 hours, and a friendly reminder that in the future you can use your [facility] account to request refills: pt stated that he has 2 pills left

## 2023-07-01 ENCOUNTER — NURSE TRIAGE (OUTPATIENT)
Dept: ADMINISTRATIVE | Facility: CLINIC | Age: 33
End: 2023-07-01
Payer: COMMERCIAL

## 2023-07-01 ENCOUNTER — PATIENT MESSAGE (OUTPATIENT)
Dept: NEUROSURGERY | Facility: CLINIC | Age: 33
End: 2023-07-01
Payer: COMMERCIAL

## 2023-07-01 DIAGNOSIS — G08 TRANSVERSE SINUS THROMBOSIS: Primary | ICD-10-CM

## 2023-07-01 NOTE — TELEPHONE ENCOUNTER
ADDENDUM 1221 - Dr Nguyen has ordered a 30-day prescription of apixaban for Samir.  Called Samir to let him know.  He is aware it is a one month order, and there are no refills, so recommend keep in contact with neurology for future need / advice for any renewals.  He states understanding.  No other needs at this time.  Message to Briseida Briones NP, neurology.  Please contact caller directly with any additional care advice.

## 2023-07-06 ENCOUNTER — OFFICE VISIT (OUTPATIENT)
Dept: URGENT CARE | Facility: CLINIC | Age: 33
End: 2023-07-06
Payer: COMMERCIAL

## 2023-07-06 VITALS
WEIGHT: 239 LBS | OXYGEN SATURATION: 98 % | RESPIRATION RATE: 20 BRPM | BODY MASS INDEX: 35.4 KG/M2 | TEMPERATURE: 98 F | HEART RATE: 87 BPM | DIASTOLIC BLOOD PRESSURE: 86 MMHG | HEIGHT: 69 IN | SYSTOLIC BLOOD PRESSURE: 127 MMHG

## 2023-07-06 DIAGNOSIS — H01.004 BLEPHARITIS OF LEFT UPPER EYELID, UNSPECIFIED TYPE: ICD-10-CM

## 2023-07-06 DIAGNOSIS — H00.014 HORDEOLUM EXTERNUM OF LEFT UPPER EYELID: Primary | ICD-10-CM

## 2023-07-06 PROCEDURE — 99213 PR OFFICE/OUTPT VISIT, EST, LEVL III, 20-29 MIN: ICD-10-PCS | Mod: S$GLB,,, | Performed by: PHYSICIAN ASSISTANT

## 2023-07-06 PROCEDURE — 99213 OFFICE O/P EST LOW 20 MIN: CPT | Mod: S$GLB,,, | Performed by: PHYSICIAN ASSISTANT

## 2023-07-06 RX ORDER — ERYTHROMYCIN 5 MG/G
OINTMENT OPHTHALMIC 3 TIMES DAILY
Qty: 1 EACH | Refills: 0 | Status: SHIPPED | OUTPATIENT
Start: 2023-07-06 | End: 2023-07-11

## 2023-07-06 NOTE — PATIENT INSTRUCTIONS
You must understand that you've received an Urgent Care treatment only and that you may be released before all your medical problems are known or treated. You, the patient, will arrange for follow up care as instructed.  Follow up with your PCP or specialty clinic as directed if not improved or as needed. You can call [phone] to schedule an appointment with the appropriate provider.  If your condition worsens we recommend that you receive another evaluation at the Emergency Department for any concerns or worsening of condition.  Patient aware and verbalized understanding.

## 2023-07-06 NOTE — PROGRESS NOTES
"Subjective:      Patient ID: Samir Farias is a 32 y.o. male.    Vitals:  height is 5' 9" (1.753 m) and weight is 108.4 kg (239 lb). His temperature is 97.5 °F (36.4 °C). His blood pressure is 127/86 and his pulse is 87. His respiration is 20 and oxygen saturation is 98%.     Chief Complaint: Eye Problem    Left eye lid swelling and irritation that started 2 days ago   Patient did use heat compress with no relief   Patient states the eye is not itching, not painful   Patient states no injury to his eye, patient has recently been cutting grass at his house but does not remember anything hitting him in the eye  Patient states no issue with vision     Eye Problem   The left eye is affected. This is a new problem. The current episode started in the past 7 days. The problem occurs constantly. The problem has been gradually worsening. There was no injury mechanism. The pain is at a severity of 2/10. The patient is experiencing no pain. There is No known exposure to pink eye. He Does not wear contacts. Associated symptoms include eye redness. Pertinent negatives include no blurred vision, eye discharge, double vision, fever, foreign body sensation, itching, nausea, photophobia, recent URI or vomiting. Treatments tried: warm compress. The treatment provided no relief.     Constitution: Negative for chills, sweating, fatigue and fever.   HENT:  Negative for ear pain, drooling, congestion, sore throat, trouble swallowing and voice change.    Neck: Negative for neck pain, neck stiffness and painful lymph nodes.   Cardiovascular:  Negative for chest pain, leg swelling, palpitations, sob on exertion and passing out.   Eyes:  Positive for eye redness. Negative for eye discharge, eye itching, eye pain, photophobia, double vision, blurred vision and eyelid swelling.   Respiratory:  Negative for chest tightness, cough, sputum production, bloody sputum, shortness of breath, stridor and wheezing.    Gastrointestinal:  Negative for " abdominal pain, abdominal bloating, nausea, vomiting, constipation, diarrhea and heartburn.   Genitourinary:  Negative for urine decreased.   Musculoskeletal:  Negative for joint pain, joint swelling, abnormal ROM of joint, pain with walking, muscle cramps and muscle ache.   Skin:  Negative for rash and hives.   Allergic/Immunologic: Negative for hives, itching and sneezing.   Neurological:  Negative for dizziness, light-headedness, passing out, loss of balance, headaches, altered mental status, loss of consciousness, numbness and seizures.   Hematologic/Lymphatic: Negative for swollen lymph nodes.   Psychiatric/Behavioral:  Negative for altered mental status and nervous/anxious. The patient is not nervous/anxious.     Objective:     Physical Exam   Constitutional: He is oriented to person, place, and time. He appears well-developed. He is cooperative.   HENT:   Head: Normocephalic and atraumatic.   Ears:   Right Ear: Hearing, tympanic membrane, external ear and ear canal normal.   Left Ear: Hearing, tympanic membrane, external ear and ear canal normal.   Nose: Nose normal. No mucosal edema or nasal deformity. No epistaxis. Right sinus exhibits no maxillary sinus tenderness and no frontal sinus tenderness. Left sinus exhibits no maxillary sinus tenderness and no frontal sinus tenderness.   Mouth/Throat: Uvula is midline, oropharynx is clear and moist and mucous membranes are normal. No trismus in the jaw. Normal dentition. No uvula swelling.   Eyes: Conjunctivae and lids are normal.      Comments: + swelling of left eyelid with mild ttp. + crusting at eyelash.    Neck: Trachea normal and phonation normal. Neck supple.   Cardiovascular: Normal rate, regular rhythm, normal heart sounds and normal pulses.   Pulmonary/Chest: Effort normal and breath sounds normal.   Abdominal: Normal appearance and bowel sounds are normal. Soft.   Musculoskeletal: Normal range of motion.         General: Normal range of motion.    Neurological: He is alert and oriented to person, place, and time. He exhibits normal muscle tone.   Skin: Skin is warm, dry and intact.   Psychiatric: His speech is normal and behavior is normal. Judgment and thought content normal.   Nursing note and vitals reviewed.    Assessment:     1. Hordeolum externum of left upper eyelid    2. Blepharitis of left upper eyelid, unspecified type        Plan:       Hordeolum externum of left upper eyelid    Blepharitis of left upper eyelid, unspecified type  -     erythromycin (ROMYCIN) ophthalmic ointment; Place into the left eye 3 (three) times daily. for 5 days  Dispense: 1 each; Refill: 0        Patient Instructions     You must understand that you've received an Urgent Care treatment only and that you may be released before all your medical problems are known or treated. You, the patient, will arrange for follow up care as instructed.  Follow up with your PCP or specialty clinic as directed if not improved or as needed. You can call [phone] to schedule an appointment with the appropriate provider.  If your condition worsens we recommend that you receive another evaluation at the Emergency Department for any concerns or worsening of condition.  Patient aware and verbalized understanding.

## 2023-07-13 ENCOUNTER — PATIENT MESSAGE (OUTPATIENT)
Dept: NEUROLOGY | Facility: CLINIC | Age: 33
End: 2023-07-13
Payer: COMMERCIAL

## 2023-07-13 ENCOUNTER — TELEPHONE (OUTPATIENT)
Dept: NEUROLOGY | Facility: CLINIC | Age: 33
End: 2023-07-13
Payer: COMMERCIAL

## 2023-07-13 NOTE — TELEPHONE ENCOUNTER
----- Message from Radames Lucio sent at 7/13/2023 10:56 AM CDT -----  Regarding: Gums bleeding/ blood thinners  Contact: [phone]  Pt calling in regards to his gums bleeding. He states its the upper left. Pt states he has not had any injuries and he is on blood thinners. Please call to discuss

## 2023-07-13 NOTE — TELEPHONE ENCOUNTER
Patient was at work over night and eating chips and a slim stefan. A piece of meat got stuck between one of his teeth, so pick picked it out. After removing the meat, the gum around one tooth is started bleeding. He washed and gargled with warm salt water, however his gum is still bleeding. He now has gauze in his mouth to put pressure on the bleeder. Throughout the day today, he has replaced the gauze when it can no longer absorb more blood. Denies heavy bleeding; described as a constant leak. Patient did get an emergent appointment scheduled with his dentist at 4:30pm today. Advised patient to keep us updated with what the dentist advises. Patient v/u.

## 2023-07-24 ENCOUNTER — HOSPITAL ENCOUNTER (OUTPATIENT)
Dept: RADIOLOGY | Facility: HOSPITAL | Age: 33
Discharge: HOME | End: 2023-07-24
Attending: NURSE PRACTITIONER
Payer: COMMERCIAL

## 2023-07-24 ENCOUNTER — PATIENT MESSAGE (OUTPATIENT)
Dept: NEUROLOGY | Facility: CLINIC | Age: 33
End: 2023-07-24
Payer: COMMERCIAL

## 2023-07-24 DIAGNOSIS — R51.9 NONINTRACTABLE HEADACHE, UNSPECIFIED CHRONICITY PATTERN, UNSPECIFIED HEADACHE TYPE: ICD-10-CM

## 2023-07-24 PROCEDURE — 70553 MRI BRAIN STEM W/O & W/DYE: CPT | Mod: 26,,, | Performed by: RADIOLOGY

## 2023-07-24 PROCEDURE — A9585 GADOBUTROL INJECTION: HCPCS | Mod: PO | Performed by: NURSE PRACTITIONER

## 2023-07-24 PROCEDURE — 70553 MRI BRAIN W WO CONTRAST: ICD-10-PCS | Mod: 26,,, | Performed by: RADIOLOGY

## 2023-07-24 PROCEDURE — 70553 MRI BRAIN STEM W/O & W/DYE: CPT | Mod: TC,PO

## 2023-07-24 PROCEDURE — 25500020 PHARM REV CODE 255: Mod: PO | Performed by: NURSE PRACTITIONER

## 2023-07-24 RX ORDER — GADOBUTROL 604.72 MG/ML
10 INJECTION INTRAVENOUS
Status: COMPLETED | OUTPATIENT
Start: 2023-07-24 | End: 2023-07-24

## 2023-07-24 RX ADMIN — GADOBUTROL 10 ML: 604.72 INJECTION INTRAVENOUS at 10:07

## 2023-07-25 ENCOUNTER — PATIENT MESSAGE (OUTPATIENT)
Dept: NEUROSURGERY | Facility: CLINIC | Age: 33
End: 2023-07-25
Payer: COMMERCIAL

## 2023-07-25 ENCOUNTER — TELEPHONE (OUTPATIENT)
Dept: NEUROLOGY | Facility: CLINIC | Age: 33
End: 2023-07-25
Payer: COMMERCIAL

## 2023-07-25 NOTE — TELEPHONE ENCOUNTER
----- Message from Johnathan Gray MA sent at 7/25/2023  4:50 PM CDT -----  Regarding: FW: refill  Contact: @[phone]  Hello, would one of these providers be able to refill this medication?  ----- Message -----  From: Berto Nguyen MD  Sent: 7/25/2023   1:12 PM CDT  To: Briseida Briones, SCOT, Johnathan Gray MA  Subject: RE: refill                                       This is a patient of Dr. Tolentino/ Briseida so they will need to refill this. I was called about this patient over a weekend and did a one time refill.     ----- Message -----  From: Johnathan Gray MA  Sent: 7/25/2023  12:37 PM CDT  To: Berto Nguyen MD  Subject: FW: refill                                       I think this is for your previous patients before you came to neurology?  ----- Message -----  From: Musa Gibson  Sent: 7/25/2023  11:40 AM CDT  To: Patrick Thomson Staff  Subject: refill                                           Pt is calling to get a refill on the following...apixaban (ELIQUIS) 5 mg Tab. Please call and adv @[phone]  [id number] [address] [facility]

## 2023-07-26 DIAGNOSIS — G08 TRANSVERSE SINUS THROMBOSIS: ICD-10-CM

## 2023-07-27 ENCOUNTER — PATIENT MESSAGE (OUTPATIENT)
Dept: NEUROSURGERY | Facility: CLINIC | Age: 33
End: 2023-07-27
Payer: COMMERCIAL

## 2023-07-27 PROBLEM — F41.9 ANXIETY: Status: ACTIVE | Noted: 2023-07-27

## 2023-07-31 DIAGNOSIS — G08 TRANSVERSE SINUS THROMBOSIS: ICD-10-CM

## 2023-08-08 ENCOUNTER — OFFICE VISIT (OUTPATIENT)
Dept: NEUROLOGY | Facility: CLINIC | Age: 33
End: 2023-08-08
Payer: COMMERCIAL

## 2023-08-08 VITALS
BODY MASS INDEX: 34.25 KG/M2 | WEIGHT: 231.25 LBS | SYSTOLIC BLOOD PRESSURE: 121 MMHG | HEIGHT: 69 IN | HEART RATE: 62 BPM | DIASTOLIC BLOOD PRESSURE: 82 MMHG

## 2023-08-08 DIAGNOSIS — Q28.3 VASCULAR ABNORMALITY OF BRAIN: Primary | ICD-10-CM

## 2023-08-08 PROCEDURE — 1160F PR REVIEW ALL MEDS BY PRESCRIBER/CLIN PHARMACIST DOCUMENTED: ICD-10-PCS | Mod: CPTII,S$GLB,, | Performed by: NURSE PRACTITIONER

## 2023-08-08 PROCEDURE — 99999 PR PBB SHADOW E&M-EST. PATIENT-LVL III: CPT | Mod: PBBFAC,,, | Performed by: NURSE PRACTITIONER

## 2023-08-08 PROCEDURE — 1160F RVW MEDS BY RX/DR IN RCRD: CPT | Mod: CPTII,S$GLB,, | Performed by: NURSE PRACTITIONER

## 2023-08-08 PROCEDURE — 3074F SYST BP LT 130 MM HG: CPT | Mod: CPTII,S$GLB,, | Performed by: NURSE PRACTITIONER

## 2023-08-08 PROCEDURE — 99999 PR PBB SHADOW E&M-EST. PATIENT-LVL III: ICD-10-PCS | Mod: PBBFAC,,, | Performed by: NURSE PRACTITIONER

## 2023-08-08 PROCEDURE — 3079F PR MOST RECENT DIASTOLIC BLOOD PRESSURE 80-89 MM HG: ICD-10-PCS | Mod: CPTII,S$GLB,, | Performed by: NURSE PRACTITIONER

## 2023-08-08 PROCEDURE — 3008F PR BODY MASS INDEX (BMI) DOCUMENTED: ICD-10-PCS | Mod: CPTII,S$GLB,, | Performed by: NURSE PRACTITIONER

## 2023-08-08 PROCEDURE — 3074F PR MOST RECENT SYSTOLIC BLOOD PRESSURE < 130 MM HG: ICD-10-PCS | Mod: CPTII,S$GLB,, | Performed by: NURSE PRACTITIONER

## 2023-08-08 PROCEDURE — 3008F BODY MASS INDEX DOCD: CPT | Mod: CPTII,S$GLB,, | Performed by: NURSE PRACTITIONER

## 2023-08-08 PROCEDURE — 1159F PR MEDICATION LIST DOCUMENTED IN MEDICAL RECORD: ICD-10-PCS | Mod: CPTII,S$GLB,, | Performed by: NURSE PRACTITIONER

## 2023-08-08 PROCEDURE — 99215 OFFICE O/P EST HI 40 MIN: CPT | Mod: S$GLB,,, | Performed by: NURSE PRACTITIONER

## 2023-08-08 PROCEDURE — 3079F DIAST BP 80-89 MM HG: CPT | Mod: CPTII,S$GLB,, | Performed by: NURSE PRACTITIONER

## 2023-08-08 PROCEDURE — 1159F MED LIST DOCD IN RCRD: CPT | Mod: CPTII,S$GLB,, | Performed by: NURSE PRACTITIONER

## 2023-08-08 PROCEDURE — 99215 PR OFFICE/OUTPT VISIT, EST, LEVL V, 40-54 MIN: ICD-10-PCS | Mod: S$GLB,,, | Performed by: NURSE PRACTITIONER

## 2023-08-10 NOTE — PROGRESS NOTES
[facility]UROLOGY CLINIC VISIT      SUBJECTIVE:    History for Present Illness: Samir Farias is a 32 y.o.  male with a past medical history of abnormal imaging finding and anxiety who presents to me in clinic today to discuss 3 months MRI follow-up.  He is accompanied to today's visit by his significant other.    Relevant HPI:    He initially presented to urgent care 2/2 progressive tension-like headache with pressure behind his eyes.  Outpatient CTA head and neck indicated a transverse sinus thrombus and possible hemorrhage of the right occipital lobe.  Patient was admitted to OSH.  MRI of the brain indicated vascular anomaly by the right temporal lobe in addition of already mentioned VST.  Patient was admitted to [facility] where he went underwent diagnostic cerebral angiogram which indicated a right sigmoid subocclusive dural sinus filling deficit in the right isolated venous varix along the superior tentorium patient patient was placed on Eliquis and discharged home.    Interval history:    At the time of today's visit, the patient denies new or worsening focal neurologic symptoms concerning for new stroke or TIA. He is doing well overall  . He denies associated nausea, vomiting, HA ,vertigo, double vision, focal weakness or numbness, gait imbalance,  language or visual disturbances.  Patient is independent with ADLs.  He denies alcohol/tobacco/illicit drug use.  Patient is adherent with home medication plan.    Past Medical History:   Diagnosis Date    Anxiety     Transverse sinus thrombosis      Past Surgical History:   Procedure Laterality Date    NO PAST SURGERIES       Family History   Problem Relation Age of Onset    No Known Problems Mother     No Known Problems Father         Current Outpatient Medications:     apixaban (ELIQUIS) 5 mg Tab, Take 1 tablet (5 mg total) by mouth 2 (two) times daily., Disp: 60 tablet, Rfl: 0    LORazepam (ATIVAN) 1 MG tablet, Take 0.5 tablets (0.5 mg total) by  "mouth every 6 (six) hours as needed for Anxiety., Disp: 10 tablet, Rfl: 0    multivit-min/folic/vit K/lycop (MEN'S MULTIVITAMIN ORAL), Take 1 tablet by mouth. 3-4 times a week, Disp: , Rfl:     traZODone (DESYREL) 50 MG tablet, Take 1 tablet (50 mg total) by mouth nightly as needed for Insomnia., Disp: 30 tablet, Rfl: 1     Review of Systems:  General: No fevers, chills  Eyes: No changes in vision  ENT: No changes in hearing  Respiratory: No SOB  CV: No chest pain, palpitations  GI: No diarrhea, blood in stool  Urinary: No dysuria, hematuria  Skin: No rashes  Neurological: No weakness, confusion  Psychiatric: No auditory nor visual hallucinations    Physical exam:    /82   Pulse 62   Ht 5' 9" (1.753 m)   Wt 104.9 kg (231 lb 4.2 oz)   BMI 34.15 kg/m²     General: Well-developed, well-groomed. No apparent distress  HENT: Normocephalic, atraumatic.    Cardiovascular: Regular rate and rhythm  Chest: No audible wheezes, stridor, ronchi appreciated.  Musculoskeletal: No peripheral edema     Neurologic Exam: The patient is awake, alert and oriented to person, place, time and situation. Attentive, vigilant during exam. Language is fluent. Naming & repetition intact, +2-step commands.  Fund of knowledge is appropriate. Well organized thoughts.     Cranial nerves:   CN II: Visual fields are full to confrontation. Pupils are 4 mm and briskly reactive to light.  CN III, IV, VI: EOMI, no nystagmus, no ptosis  CN V: Facial sensation is intact in all 3 divisions bilaterally.  CN VII: Face is symmetric with normal eye closure and smile.  CN VIII: Hearing is normal bilaterally  CN IX, X: Palate elevates symmetrically. Phonation is normal.  CN XI: Head turning and shoulder shrug are intact  CN XII: Tongue is midline with normal movements and no atrophy.     Motor examination of all extremities demonstrates normal bulk and tone in all four limbs. There are no atrophy or fasciculations.        Left Right     Left Right "   Deltoid 5/5 5/5   Hip Flexion 5/5 5/5   Biceps 5/5 5/5   Hip Extension 5/5 5/5   Triceps 5/5 5/5   Knee Flexion 5/5 5/5   Wrist Ext 5/5 5/5   Knee Extension 5/5 5/5   Finger Abd 5/5 5/5   Ankle dorsiflex 5/5 5/5           Ankle plantar flex 5/5 5/5     Sensory examination is normal light touch in BUE and BLE.  Romberg is negative.  Deep tendon reflexes No clonus. Negative Lira's     Gait: Normal tandem, and casual gait.     Coordination: No dysmetria with finger-to-nose. Rapid alternating movements and fine finger movements are intact.         Relevant Labwork:  Recent Labs   Lab 08/09/23  0904   LDL Cholesterol 87.4   HDL 78 H   Triglycerides 43   Cholesterol 174       Diagnostic Results:  Imaging was independently reviewed by myself, along with the associated radiology report    Brain Imaging   MRI of the brain 07/24/2023:  -No acute intracranial abnormalities or significant interval changes as compared to prior study.  Stable large filling deficit in the right transverse sigmoid sinus junction with severe tapering of the sinus most compatible with small right temporal lobe herniation (encephalocele) into a large arachnoid granulation.  -Stable bilobed venous varix along the right cerebellar tentorium  Vessel Imaging   IR angiogram 04/01/2023:  No arterial abnormalities are seen.  No AVM or arterial aneurysm.  Venous abnormalities are identified including tapering and near complete occlusion of the right transverse-sigmoid sinus junction.  This is identified on right carotid injections.  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  This has slow washout in the venous phase.    Cardiac Imaging     Assessment:  Samir Farias  is a 32 y.o.  male  seen today in clinic for follow-up assessment and recommendations. The following recommendations and plan were discussed in depth with the patient who voiced  understanding and was in agreement.  Plan:  Vascular anomaly  -repeat MRI of the brain negative for venous sinus thrombus. Will discontinue Eliquis.  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   -No clinical indication for intervention at this time as patient is currently asymptomatic.  We discussed symptoms of intracranial hypertension.  Patient verbalized understanding.  Should patient develop symptoms associated with intracranial hypertension, we will re-evaluate the need for endovascular intervention at that time    Venous varix  -stable bilateral venous varix along the right cerebellar tentorium noted  -no clinical indication for intervention at this time     Patient/Family teaching provided during this visit  -Identifying the signs and symptoms of stroke; emergency action and ER attention  -Risk factor control  -Optimization for secondary stroke prevention including compliance with current medications       I will plan on having Samir return to clinic as needed. The patient can contact my office with any questions or concerns they may have as they arise in the interim.     45 minutes of total time spent on the encounter, which includes face to face time and non-face to face time preparing to see the patient (eg, review of tests), Obtaining and/or reviewing separately obtained history, Documenting clinical information in the electronic or other health record, Independently interpreting results (not separately reported) and communicating results to the patient/family/caregiver, patient/family education and Care coordination (not separately reported).     Briseida Briones NP-C  Department of Vascular Neurology  [facility]  [phone]    This note is dictated on M*Modal Fluency Direct word recognition program. There are word recognition mistakes that are occasionally missed on review

## 2023-08-14 ENCOUNTER — OFFICE VISIT (OUTPATIENT)
Dept: OPTOMETRY | Facility: CLINIC | Age: 33
End: 2023-08-14
Payer: COMMERCIAL

## 2023-08-14 DIAGNOSIS — H53.143 PHOTOPHOBIA OF BOTH EYES: ICD-10-CM

## 2023-08-14 DIAGNOSIS — Q28.3 VASCULAR ABNORMALITY OF BRAIN: Primary | ICD-10-CM

## 2023-08-14 DIAGNOSIS — H52.13 MYOPIA, BILATERAL: ICD-10-CM

## 2023-08-14 DIAGNOSIS — Z13.5 GLAUCOMA SCREENING: ICD-10-CM

## 2023-08-14 PROCEDURE — 99999 PR PBB SHADOW E&M-EST. PATIENT-LVL II: CPT | Mod: PBBFAC,,, | Performed by: OPTOMETRIST

## 2023-08-14 PROCEDURE — 92004 COMPRE OPH EXAM NEW PT 1/>: CPT | Mod: S$GLB,,, | Performed by: OPTOMETRIST

## 2023-08-14 PROCEDURE — 92015 PR REFRACTION: ICD-10-PCS | Mod: S$GLB,,, | Performed by: OPTOMETRIST

## 2023-08-14 PROCEDURE — 99999 PR PBB SHADOW E&M-EST. PATIENT-LVL II: ICD-10-PCS | Mod: PBBFAC,,, | Performed by: OPTOMETRIST

## 2023-08-14 PROCEDURE — 1159F MED LIST DOCD IN RCRD: CPT | Mod: CPTII,S$GLB,, | Performed by: OPTOMETRIST

## 2023-08-14 PROCEDURE — 1159F PR MEDICATION LIST DOCUMENTED IN MEDICAL RECORD: ICD-10-PCS | Mod: CPTII,S$GLB,, | Performed by: OPTOMETRIST

## 2023-08-14 PROCEDURE — 92004 PR EYE EXAM, NEW PATIENT,COMPREHESV: ICD-10-PCS | Mod: S$GLB,,, | Performed by: OPTOMETRIST

## 2023-08-14 PROCEDURE — 92015 DETERMINE REFRACTIVE STATE: CPT | Mod: S$GLB,,, | Performed by: OPTOMETRIST

## 2023-08-14 NOTE — PATIENT INSTRUCTIONS
"DRY EYES -- BURNING OR SOLIS SYMPTOMS:  Use Over The Counter artificial tears as needed for dry eye symptoms.   Some common brands include:  Systane, Optive, Refresh, and Thera-Tears.  These drops can be used as frequently as desired, but may be most helpful use during long periods of concentrated work.  For example, reading / working at the computer. Start with 3-4x per day.     Nighttime Ophthalmic gel or ointments are available: Refresh PM, Genteal, and Lacrilube.    Avoid drops that "get redness out" (Visine, Murine, Clear Eyes), as these may contain medication that could further irritate the eyes, especially with chronic use.    ALLERGY EYES -- ITCHING SYMPTOMS:  Over the counter medications include--Pataday, Zaditor, and Alaway.  Use as directed 1-2 drops daily for symptoms of itching / watering eyes.  These drops will not help for dry eye or exposure symptoms.    REDNESS RELIEF:  Lumify---is a good redness reliever that will not cause irritation if used chronically.        FLASHES / FLOATERS / POSTERIOR VITREOUS DETACHMENT    Call the clinic if you have any further changes in symptoms.  Including:  Increased numbers of floaters or flashing lights, dimness or darkness that moves through or stays constant in your vision, or any pain in the eye (s).    You may sometimes see small specks or clouds moving in your field of vision.  They are called FLOATERS.  You can often see them when looking at a plain background, like a blank wall or blue kristie.  Floaters are actually tiny clumps of gel or cells inside the VITREOUS, the clear jelly-like fluid that fills the inside of your eye.    While these objects look like they are in front of your eye, they are actually floating inside.  What you see are the shadows they cast on the RETINA, the nerve layer at the back of the eye that senses light and allows you to see.      POSTERIOR VITREOUS DETACHMENT    The appearance of new floaters may be alarming.  If you suddenly " develop new floaters, you should contact your eye care professional  right away.    The retina can tear if the shrinking vitreous pulls away from the wall of the eye.  This sometimes causes a small amount of bleeding in the eye that may appear as new floaters.    A torn retina is always a serious problem, since it can lead to a retinal detachment.  You should see your eye care professional as soon as possible if:    even one new floater appears suddenly;  you see sudden flashes of light;  you notice other symptoms, like the loss of side vision, or a curtain closes down in your vision        POSTERIOR VITREOUS DETACHMENT is more common for people who:    are nearsighted;  have had cataract surgery;  have had YAG laser surgery of the eye;  have had inflammation inside the eye;  are over age 60.      While some floaters may remain visible, many of them will fade over time and become less noticeable.  Even if you've had some floaters for years, you should have your eyes checked as soon as possible if you notice new ones.    FLASHING LIGHTS    When the vitreous gel rubs or pulls on the retina, you may see what look like flashing lights or lightning streaks.  These flashes can appear off and on for several weeks or months.      Some people experience flashes of light that appear as jagged lines or heat waves in both eyes, lasting 10-20 minutes.  These flashes are caused by a spasm of blood vessels in the brain, which is called a migraine.    If a headache follows these flashes, it's called a migraine headache.  If   no headache occurs, these flashes are called Ophthalmic or Ocular Migraine.

## 2023-08-14 NOTE — PROGRESS NOTES
HPI    New pt here for eye exam. Last exam- middle school/high school    Pt sts he wore glasses in middle school and has not worn since. Pt c/o   photophobia x 1 weeks.Pt denies flashes/floaters/pain. Pt denies use of   gtt.       Agree above  Feels VA is stable, but notes worsening light sens jing at night /   headlights   No other new oc/ vis complaint   Dx w/ brain vascular anomaly early this year ---off blood thinners after   recent re imaging     Last edited by LETICIA Salas, OD on 8/14/2023  2:34 PM.            Assessment /Plan     For exam results, see Encounter Report.    Vascular abnormality of brain  -     Hernández Visual Field - OU - Extended - Both Eyes; Future  -     OCT, Optic Nerve - OU - Both Eyes; Future    Photophobia of both eyes    Glaucoma screening    Myopia, bilateral      No gross ocular / visual manifestations ---IOP/ DFE appears normal   Advised baseline visual fields and OCT nerves --future   2.   Gradually worsening w/ age, very low Rx, can consider transitions or light tint/ AR coating for night driving only   3.   Not suspect   4.   Low Rx, gave copy for distance / driving     Discussed and educated patient on current findings /plan.  RTC 1 year, prn if any changes / issues

## 2023-10-13 ENCOUNTER — PATIENT MESSAGE (OUTPATIENT)
Dept: NEUROSURGERY | Facility: CLINIC | Age: 33
End: 2023-10-13
Payer: COMMERCIAL

## 2023-11-07 ENCOUNTER — PATIENT MESSAGE (OUTPATIENT)
Dept: NEUROSURGERY | Facility: CLINIC | Age: 33
End: 2023-11-07
Payer: COMMERCIAL

## 2023-12-05 ENCOUNTER — TELEPHONE (OUTPATIENT)
Dept: NEUROSURGERY | Facility: CLINIC | Age: 33
End: 2023-12-05
Payer: OTHER MISCELLANEOUS

## 2023-12-05 NOTE — TELEPHONE ENCOUNTER
Spoke w/ pt. Pt had previously cancelled MRI. Pt stated that he has applied for FA. Offered to r/s imaging and appt for after financial assistance has gotten back to him. Pt requested to be rescheduled for January. R/s MRI and appt for Jan. Pt confirmed times and dates. Pt knows to call office if he needs to r/s those appts.   
2

## 2024-01-08 ENCOUNTER — PATIENT MESSAGE (OUTPATIENT)
Dept: NEUROSURGERY | Facility: CLINIC | Age: 34
End: 2024-01-08
Payer: OTHER MISCELLANEOUS

## 2024-02-19 ENCOUNTER — OFFICE VISIT (OUTPATIENT)
Dept: URGENT CARE | Facility: CLINIC | Age: 34
End: 2024-02-19

## 2024-02-19 VITALS
TEMPERATURE: 97 F | BODY MASS INDEX: 34.25 KG/M2 | RESPIRATION RATE: 18 BRPM | SYSTOLIC BLOOD PRESSURE: 132 MMHG | HEIGHT: 69 IN | WEIGHT: 231.25 LBS | HEART RATE: 69 BPM | OXYGEN SATURATION: 98 % | DIASTOLIC BLOOD PRESSURE: 84 MMHG

## 2024-02-19 DIAGNOSIS — H00.024 HORDEOLUM INTERNUM LEFT UPPER EYELID: Primary | ICD-10-CM

## 2024-02-19 PROCEDURE — 99213 OFFICE O/P EST LOW 20 MIN: CPT | Mod: S$GLB,,, | Performed by: EMERGENCY MEDICINE

## 2024-02-19 RX ORDER — POLYMYXIN B SULFATE AND TRIMETHOPRIM 1; 10000 MG/ML; [USP'U]/ML
1 SOLUTION OPHTHALMIC EVERY 6 HOURS
Qty: 5 ML | Refills: 0 | Status: SHIPPED | OUTPATIENT
Start: 2024-02-19 | End: 2024-02-26

## 2024-02-19 NOTE — PROGRESS NOTES
"Subjective:      Patient ID: Samir Farias is a 33 y.o. male.    Vitals:  height is 5' 9" (1.753 m) and weight is 104.9 kg (231 lb 4.2 oz). His oral temperature is 97.2 °F (36.2 °C). His blood pressure is 132/84 and his pulse is 69. His respiration is 18 and oxygen saturation is 98%.     Chief Complaint: Eye Problem    Pt presents today with suspected stye on left eye to left upper lid. Pt complains of pain when he blinks. Pain 5/10. Pt noticed stye Saturday (2 days ago). Mild pain. No visual change. No contacts. Has white bump on inner upper lid.    Other  This is a new problem. The current episode started in the past 7 days. The problem occurs constantly. The problem has been unchanged. Pertinent negatives include no abdominal pain, chest pain, chills, congestion, coughing, fatigue, fever, headaches, myalgias, nausea, rash or sore throat. Nothing aggravates the symptoms. Treatments tried: warm compress. The treatment provided no relief.       Constitution: Negative for chills, fatigue and fever.   HENT:  Negative for congestion, sinus pain, sinus pressure and sore throat.    Cardiovascular:  Negative for chest pain and palpitations.   Eyes:  Positive for eye pain, eye redness and eyelid swelling. Negative for photophobia, vision loss and blurred vision.   Respiratory:  Negative for cough and shortness of breath.    Gastrointestinal:  Negative for abdominal pain, nausea and diarrhea.   Genitourinary:  Negative for dysuria and urgency.   Musculoskeletal:  Negative for muscle cramps and muscle ache.   Skin:  Negative for rash and hives.   Allergic/Immunologic: Negative for hives.   Neurological:  Negative for headaches.      Objective:     Physical Exam   Constitutional: He is oriented to person, place, and time. He appears well-developed. He is cooperative.  Non-toxic appearance. He does not appear ill. No distress.   HENT:   Head: Normocephalic and atraumatic.   Ears:   Right Ear: Hearing and external ear normal. "   Left Ear: Hearing and external ear normal.   Nose: Nose normal. No mucosal edema, rhinorrhea or nasal deformity. No epistaxis. Right sinus exhibits no maxillary sinus tenderness and no frontal sinus tenderness. Left sinus exhibits no maxillary sinus tenderness and no frontal sinus tenderness.   Mouth/Throat: Uvula is midline, oropharynx is clear and moist and mucous membranes are normal. No trismus in the jaw. Normal dentition. No uvula swelling. No oropharyngeal exudate, posterior oropharyngeal edema or posterior oropharyngeal erythema.   Eyes: Conjunctivae and lids are normal. Pupils are equal, round, and reactive to light. Right eye exhibits no discharge. Left eye exhibits no discharge. No scleral icterus.     Extraocular movement intact   Neck: Trachea normal and phonation normal. No edema present. No erythema present.   Cardiovascular: Normal rate, regular rhythm and normal pulses.   Pulmonary/Chest: Effort normal. No respiratory distress. He has no decreased breath sounds.   Abdominal: Normal appearance.   Musculoskeletal: Normal range of motion.         General: No deformity. Normal range of motion.   Neurological: He is alert and oriented to person, place, and time. He exhibits normal muscle tone. Coordination normal.   Skin: Skin is warm, dry, intact, not diaphoretic and not pale.   Psychiatric: His speech is normal and behavior is normal. Judgment and thought content normal.   Nursing note and vitals reviewed.      Assessment:     1. Hordeolum internum left upper eyelid        Plan:   Will do drops since pustule on underside of lid and may get better med delivery.    Hordeolum internum left upper eyelid  -     polymyxin B sulf-trimethoprim (POLYTRIM) 10,000 unit- 1 mg/mL Drop; Place 1 drop into the left eye every 6 (six) hours. for 7 days  Dispense: 5 mL; Refill: 0      Patient Instructions   Frequent warm moist compresses to lid.     Clean lid with baby shampoo like Ever and Ever No More Tears to  prevent bacteria buildup.     See eye specialist in 1-2 days if not improving.

## 2024-02-19 NOTE — PATIENT INSTRUCTIONS
Frequent warm moist compresses to lid.     Clean lid with baby shampoo like Ever and Ever No More Tears to prevent bacteria buildup.     See eye specialist in 1-2 days if not improving.

## 2024-05-16 ENCOUNTER — PATIENT MESSAGE (OUTPATIENT)
Dept: NEUROSURGERY | Facility: CLINIC | Age: 34
End: 2024-05-16
Payer: COMMERCIAL

## 2024-05-16 ENCOUNTER — TELEPHONE (OUTPATIENT)
Dept: NEUROSURGERY | Facility: CLINIC | Age: 34
End: 2024-05-16
Payer: COMMERCIAL

## 2024-05-16 ENCOUNTER — PATIENT MESSAGE (OUTPATIENT)
Dept: NEUROLOGY | Facility: CLINIC | Age: 34
End: 2024-05-16
Payer: COMMERCIAL

## 2024-05-16 DIAGNOSIS — G08 ACUTE CEREBRAL VENOUS SINUS THROMBOSIS: Primary | ICD-10-CM

## 2024-06-10 ENCOUNTER — HOSPITAL ENCOUNTER (OUTPATIENT)
Dept: RADIOLOGY | Facility: HOSPITAL | Age: 34
Discharge: HOME | End: 2024-06-10
Attending: PHYSICIAN ASSISTANT
Payer: COMMERCIAL

## 2024-06-10 DIAGNOSIS — G08 ACUTE CEREBRAL VENOUS SINUS THROMBOSIS: ICD-10-CM

## 2024-06-10 PROCEDURE — 25500020 PHARM REV CODE 255: Mod: PO | Performed by: PHYSICIAN ASSISTANT

## 2024-06-10 PROCEDURE — 70553 MRI BRAIN STEM W/O & W/DYE: CPT | Mod: TC,PO

## 2024-06-10 PROCEDURE — 70553 MRI BRAIN STEM W/O & W/DYE: CPT | Mod: 26,,, | Performed by: RADIOLOGY

## 2024-06-10 PROCEDURE — A9585 GADOBUTROL INJECTION: HCPCS | Mod: PO | Performed by: PHYSICIAN ASSISTANT

## 2024-06-10 RX ORDER — GADOBUTROL 604.72 MG/ML
10 INJECTION INTRAVENOUS
Status: COMPLETED | OUTPATIENT
Start: 2024-06-10 | End: 2024-06-10

## 2024-06-10 RX ADMIN — GADOBUTROL 10 ML: 604.72 INJECTION INTRAVENOUS at 10:06

## 2024-06-11 ENCOUNTER — OFFICE VISIT (OUTPATIENT)
Dept: NEUROSURGERY | Facility: CLINIC | Age: 34
End: 2024-06-11
Payer: COMMERCIAL

## 2024-06-11 VITALS — HEART RATE: 55 BPM | SYSTOLIC BLOOD PRESSURE: 117 MMHG | DIASTOLIC BLOOD PRESSURE: 78 MMHG

## 2024-06-11 DIAGNOSIS — H93.19 TINNITUS, UNSPECIFIED LATERALITY: Primary | ICD-10-CM

## 2024-06-11 DIAGNOSIS — Q27.8 CONGENITAL VENOUS VARIX: ICD-10-CM

## 2024-06-11 PROCEDURE — 1159F MED LIST DOCD IN RCRD: CPT | Mod: CPTII,S$GLB,, | Performed by: STUDENT IN AN ORGANIZED HEALTH CARE EDUCATION/TRAINING PROGRAM

## 2024-06-11 PROCEDURE — 99999 PR PBB SHADOW E&M-EST. PATIENT-LVL III: CPT | Mod: PBBFAC,,, | Performed by: STUDENT IN AN ORGANIZED HEALTH CARE EDUCATION/TRAINING PROGRAM

## 2024-06-11 PROCEDURE — 3074F SYST BP LT 130 MM HG: CPT | Mod: CPTII,S$GLB,, | Performed by: STUDENT IN AN ORGANIZED HEALTH CARE EDUCATION/TRAINING PROGRAM

## 2024-06-11 PROCEDURE — 3078F DIAST BP <80 MM HG: CPT | Mod: CPTII,S$GLB,, | Performed by: STUDENT IN AN ORGANIZED HEALTH CARE EDUCATION/TRAINING PROGRAM

## 2024-06-11 PROCEDURE — 99214 OFFICE O/P EST MOD 30 MIN: CPT | Mod: S$GLB,,, | Performed by: STUDENT IN AN ORGANIZED HEALTH CARE EDUCATION/TRAINING PROGRAM

## 2024-06-15 NOTE — PROGRESS NOTES
"Neurosurgery  History & Physical    SUBJECTIVE:     Chief Complaint:  Venous varix    History of Present Illness:  Patient is a 32-year-old man who previously presented concerning for a right transverse sinus thrombosis.  He presented to an outside facility with complaints of ongoing headache.  He had some progressive tension like headaches with some pressure with symptoms behind the eyes.  Any note that he typically does not have headaches.  He would taken over-the-counter analgesics (Tylenol), with minimal relief.  At the time he presented he did not have any sinus type injuries there was no pain with extraocular movement.  There was no diplopia no blurry vision in no exacerbation with leaning forward or focal weakness.  CT scan was concerning that there was thrombosis in the sinus verses possible hemorrhage in the right occipital lobe.  He was transferred to [facility], and was noted to have a venous varix at the right transverse sinus.  He notes today that he denies any new headache, nausea, vomiting.  He has been treated with Eliquis at the this is a virtual audio visual visit moment and his scheduled follow-up.  He is here to establish care with Neurosurgery.    Interval History:   6/11/24:   Patient presents for follow up after undergoing MRI Brain W and WO Contrast which demonstrates slight enlargement of his venous varix. Since his last appointment, he has not had any more episodes of severe headache. However, he continues to have right side pressure-like headaches behind and around the eye. He additionally experiences an intermittent "whooshing sound" in the right ear. He denies any seizure-like activity, weakness, or numbness in any extremity.     Review of patient's allergies indicates:   Allergen Reactions    Ceclor [cefaclor] Other (See Comments)     Unknown reaction to medication, allergy as a child        Current Outpatient Medications   Medication Sig Dispense Refill    ibuprofen " (ADVIL,MOTRIN) 800 MG tablet Take 1 tablet (800 mg total) by mouth every 8 (eight) hours as needed for Pain. 20 tablet 0     No current facility-administered medications for this visit.       Past Medical History:   Diagnosis Date    Anxiety     Transverse sinus thrombosis      Past Surgical History:   Procedure Laterality Date    NO PAST SURGERIES       Family History       Problem Relation (Age of Onset)    Glaucoma Maternal Grandfather    No Known Problems Mother, Father          Social History     Socioeconomic History    Marital status: Single   Tobacco Use    Smoking status: Former     Types: Cigarettes    Smokeless tobacco: Never    Tobacco comments:     Social smoker, denies daily use    Substance and Sexual Activity    Alcohol use: No    Drug use: No    Sexual activity: Yes     Partners: Female     Social Determinants of Health     Financial Resource Strain: Low Risk  (4/1/2023)    Overall Financial Resource Strain (CARDIA)     Difficulty of Paying Living Expenses: Not hard at all   Food Insecurity: No Food Insecurity (4/1/2023)    Hunger Vital Sign     Worried About Running Out of Food in the Last Year: Never true     Ran Out of Food in the Last Year: Never true   Transportation Needs: No Transportation Needs (4/1/2023)    PRAPARE - Transportation     Lack of Transportation (Medical): No     Lack of Transportation (Non-Medical): No   Physical Activity: Sufficiently Active (3/18/2024)    Exercise Vital Sign     Days of Exercise per Week: 6 days     Minutes of Exercise per Session: 30 min   Stress: No Stress Concern Present (3/18/2024)    [employer] [employer] - Occupational Stress Questionnaire     Feeling of Stress : Not at all   Housing Stability: Unknown (4/1/2023)    Housing Stability Vital Sign     Unstable Housing in the Last Year: No     OBJECTIVE:     Vital Signs  Pulse: (!) 55  BP: 117/78  Pain Score: 0-No pain  There is no height or weight on file to calculate  BMI.      Neurosurgery Physical Exam  General: well developed, well nourished, no distress.   Head: normocephalic, atraumatic  Mental Status: Awake, Alert, Oriented  Speech: Clear with content appropriate to conversation  Cranial nerves: face symmetric, CN II-XII grossly intact.   Eyes: pupils equal, round, reactive to light, EOMI.  Sensory: intact to light touch throughout    Motor Strength: Moves all extremities spontaneously with good tone.  Full strength upper and lower extremities. No abnormal movements seen.     Pronator Drift: no drift noted  Finger-to-nose: Intact bilaterally    Diagnostic Results:  MRI Brain W WO Contrast 6/10/24:  - Bilobed homogeneously enhancing structure along the upper margin of the right tentorium measuring 17 x 18 x 6mm, increased in size in comparison to prior imaging.     I have personally reviewed the above referenced imaging.     ASSESSMENT/PLAN:     33-year-old man, who previously presented with headache and was found to have a venous varix of the right transverse sinus. Presents today for follow up with interval enlargement on most recent MRI.     1. No significant change in clinical exam    2. CT Temporal Bones for better visualization of the middle fossa    3. MRI with post-contrast MPRAGE sequence and 1mm T2 cuts    4. Continued follow-up with Neurology, continued anticoagulation per his neurologist.      Case and plan discussed with cerebrovascular specialist Dr. Ramon who would like to follow up with Mr. Farias upon completion of the above referenced imaging.         Fabi Guevara PA-C  Department of Neurosurgery  [facility]

## 2024-06-17 ENCOUNTER — TELEPHONE (OUTPATIENT)
Dept: NEUROSURGERY | Facility: CLINIC | Age: 34
End: 2024-06-17
Payer: COMMERCIAL

## 2024-06-17 NOTE — TELEPHONE ENCOUNTER
----- Message from Javed Patrick sent at 6/17/2024  2:37 PM CDT -----  Regarding: appt access  Contact: pt @  [phone]  Pt is requesting a call back from someone in the office in reference to upcoming appts. Please call to advise further. Thank you for all you are doing.

## 2024-06-17 NOTE — TELEPHONE ENCOUNTER
Returned call to pt. Pt questioned what are the reason for these appts. Explained that pt saw Fabi GARY on 6/11/2024 and she ordered an MRI and CT to have a better evaluation and a follow up appointment with Dr. Thomas to discuss about his plan of care. PT v/u.

## 2024-07-16 ENCOUNTER — HOSPITAL ENCOUNTER (OUTPATIENT)
Dept: RADIOLOGY | Facility: HOSPITAL | Age: 34
Discharge: HOME | End: 2024-07-16
Attending: STUDENT IN AN ORGANIZED HEALTH CARE EDUCATION/TRAINING PROGRAM
Payer: COMMERCIAL

## 2024-07-16 DIAGNOSIS — H93.19 TINNITUS, UNSPECIFIED LATERALITY: ICD-10-CM

## 2024-07-16 DIAGNOSIS — Q27.8 CONGENITAL VENOUS VARIX: ICD-10-CM

## 2024-07-16 PROCEDURE — 70482 CT ORBIT/EAR/FOSSA W/O&W/DYE: CPT | Mod: 26,,, | Performed by: RADIOLOGY

## 2024-07-16 PROCEDURE — 70482 CT ORBIT/EAR/FOSSA W/O&W/DYE: CPT | Mod: TC

## 2024-07-16 PROCEDURE — 25500020 PHARM REV CODE 255: Performed by: STUDENT IN AN ORGANIZED HEALTH CARE EDUCATION/TRAINING PROGRAM

## 2024-07-16 PROCEDURE — 70553 MRI BRAIN STEM W/O & W/DYE: CPT | Mod: 26,,, | Performed by: RADIOLOGY

## 2024-07-16 PROCEDURE — A9585 GADOBUTROL INJECTION: HCPCS | Performed by: STUDENT IN AN ORGANIZED HEALTH CARE EDUCATION/TRAINING PROGRAM

## 2024-07-16 PROCEDURE — 70553 MRI BRAIN STEM W/O & W/DYE: CPT | Mod: TC

## 2024-07-16 RX ORDER — GADOBUTROL 604.72 MG/ML
10 INJECTION INTRAVENOUS
Status: COMPLETED | OUTPATIENT
Start: 2024-07-16 | End: 2024-07-16

## 2024-07-16 RX ADMIN — IOHEXOL 75 ML: 350 INJECTION, SOLUTION INTRAVENOUS at 03:07

## 2024-07-16 RX ADMIN — GADOBUTROL 10 ML: 604.72 INJECTION INTRAVENOUS at 03:07

## 2024-07-30 ENCOUNTER — TELEPHONE (OUTPATIENT)
Dept: NEUROSURGERY | Facility: CLINIC | Age: 34
End: 2024-07-30

## 2024-07-30 ENCOUNTER — OFFICE VISIT (OUTPATIENT)
Dept: NEUROSURGERY | Facility: CLINIC | Age: 34
End: 2024-07-30
Payer: COMMERCIAL

## 2024-07-30 VITALS — SYSTOLIC BLOOD PRESSURE: 134 MMHG | DIASTOLIC BLOOD PRESSURE: 85 MMHG | HEART RATE: 58 BPM

## 2024-07-30 DIAGNOSIS — I67.1 CEREBRAL ANEURYSM: Primary | ICD-10-CM

## 2024-07-30 DIAGNOSIS — H93.A9 PULSATILE TINNITUS, UNSPECIFIED EAR: Primary | ICD-10-CM

## 2024-07-30 PROCEDURE — 3079F DIAST BP 80-89 MM HG: CPT | Mod: CPTII,S$GLB,, | Performed by: NEUROLOGICAL SURGERY

## 2024-07-30 PROCEDURE — 3075F SYST BP GE 130 - 139MM HG: CPT | Mod: CPTII,S$GLB,, | Performed by: NEUROLOGICAL SURGERY

## 2024-07-30 PROCEDURE — 99215 OFFICE O/P EST HI 40 MIN: CPT | Mod: S$GLB,,, | Performed by: NEUROLOGICAL SURGERY

## 2024-07-30 PROCEDURE — 99999 PR PBB SHADOW E&M-EST. PATIENT-LVL II: CPT | Mod: PBBFAC,,, | Performed by: NEUROLOGICAL SURGERY

## 2024-07-30 PROCEDURE — 1159F MED LIST DOCD IN RCRD: CPT | Mod: CPTII,S$GLB,, | Performed by: NEUROLOGICAL SURGERY

## 2024-07-30 NOTE — PROGRESS NOTES
Neurosurgery  Established Patient    Scribe Attestation  I, Bridget Arias, attest that this documentation has been prepared under the direction and in the presence of Evelina Thomas MD.    SUBJECTIVE:     History of Present Illness (per Fabi Guevara PA-C) 06/11/24:  Patient is a 32-year-old man who previously presented concerning for a right transverse sinus thrombosis.  He presented to an outside facility with complaints of ongoing headache.  He had some progressive tension like headaches with some pressure with symptoms behind the eyes.  Any note that he typically does not have headaches.  He would taken over-the-counter analgesics (Tylenol), with minimal relief.  At the time he presented he did not have any sinus type injuries there was no pain with extraocular movement.  There was no diplopia no blurry vision in no exacerbation with leaning forward or focal weakness.  CT scan was concerning that there was thrombosis in the sinus verses possible hemorrhage in the right occipital lobe.  He was transferred to [facility], and was noted to have a venous varix at the right transverse sinus.  He notes today that he denies any new headache, nausea, vomiting.  He has been treated with Eliquis at the this is a virtual audio visual visit moment and his scheduled follow-up.  He is here to establish care with Neurosurgery.    Interval history 07/30/24:  Patient returns to clinic for f/u on imaging results after seeing Fabi Guevara PA-C on 06/11/24. During that visit, he had undergone an MRI Brain which demonstrated slight enlargement of known venous varix at right transverse sinus. Today he reports feeling overall well. Denies HA, fever, chills, or nausea. No numbness or tingling. No new acute changes or issues to report.     Review of patient's allergies indicates:   Allergen Reactions    Ceclor [cefaclor] Other (See Comments)     Unknown reaction to medication, allergy as a child        Current Outpatient  Medications   Medication Sig Dispense Refill    ibuprofen (ADVIL,MOTRIN) 800 MG tablet Take 1 tablet (800 mg total) by mouth every 8 (eight) hours as needed for Pain. 20 tablet 0     No current facility-administered medications for this visit.       Past Medical History:   Diagnosis Date    Anxiety     Transverse sinus thrombosis      Past Surgical History:   Procedure Laterality Date    NO PAST SURGERIES       Family History       Problem Relation (Age of Onset)    Glaucoma Maternal Grandfather    No Known Problems Mother, Father          Social History     Socioeconomic History    Marital status: Single   Tobacco Use    Smoking status: Former     Types: Cigarettes    Smokeless tobacco: Never    Tobacco comments:     Social smoker, denies daily use    Substance and Sexual Activity    Alcohol use: No    Drug use: No    Sexual activity: Yes     Partners: Female     Social Determinants of Health     Financial Resource Strain: Low Risk  (4/1/2023)    Overall Financial Resource Strain (CARDIA)     Difficulty of Paying Living Expenses: Not hard at all   Food Insecurity: No Food Insecurity (4/1/2023)    Hunger Vital Sign     Worried About Running Out of Food in the Last Year: Never true     Ran Out of Food in the Last Year: Never true   Transportation Needs: No Transportation Needs (4/1/2023)    PRAPARE - Transportation     Lack of Transportation (Medical): No     Lack of Transportation (Non-Medical): No   Physical Activity: Sufficiently Active (3/18/2024)    Exercise Vital Sign     Days of Exercise per Week: 6 days     Minutes of Exercise per Session: 30 min   Stress: No Stress Concern Present (3/18/2024)    [employer] - Occupational Stress Questionnaire     Feeling of Stress : Not at all   Housing Stability: Unknown (4/1/2023)    Housing Stability Vital Sign     Unstable Housing in the Last Year: No     Review of Systems   All other systems reviewed and are negative.    OBJECTIVE:     Vital  Signs  Pulse: (!) 58  BP: 134/85  Pain Score: 0-No pain  There is no height or weight on file to calculate BMI.    Neurosurgery Physical Exam  General: no acute distress  Head: Non-traumatic, normocephalic  Eyes: Pupils equal, EOMI  Neck: Supple, normal ROM, no tenderness to palpation  CVS: Normal rate and rhythm, distal pulses present  Pulm: Symmetric expansion, no respiratory distress  GI: Abdomen nondistended, nontender    MSK: Moves all extremities without restriction, atraumatic  Skin: Dry, intact  Psych: Normal thought content and cognition    Neuro:  Alert, awake, oriented, to self, place, time  Language:  Speech is fluent, goal directed without any noted dysarthria or aphasia    Cranial nerves:    CNII-XII: Intact on fine exam,   Pupils equal round react to light,   Extraocular muscles are intact  V1 to V3 is intact to light touch,   no facial asymmetry,   Hearing is intact to finger rub and voice  tongue/uvula/palate midline,   shoulder shrug equal,     No pronator drift    Extremities:  Motor:  Upper Extremity    Deltoid Triceps Biceps Wrist  Extension Wrist  Flexion Interosseous   R 5/5 5/5 5/5 5/5 5/5 5/5   L 5/5 5/5 5/5 5/5 5/5 5/5       Thumb   Abduction Thumb  ADDuction Finger  Flexion Finger  Extension     R 5/5 5/5 5/5 5/5     L 5/5 5/5 5/5 5/5        Lower Extremity     Iliopsoas Quadriceps Hamstring Plantarflexion Dorsiflexion EHL   R 5/5 5/5 5/5 5/5 5/5 5/5   L 5/5 5/5 5/5 5/5 5/5 5/5       Reflexes:     DTR: 2+ biceps    2+ triceps   2+ brachioradialis   2+ patellar  2+ Achilles     Lira's: Negative     Babinski's: Negative     Clonus: Negative     Sensory:      Sensation intact to light touch, temperature sensation, vibration, pinprick     Coordination:      Coordination intact throughout, no dysmetria, normal rapid alternating movements, no dysdiadochokinesia  Cerebellar:  Normal finger-to-nose, normal heel-to-shin  Cervical spine:  No midline cervical tenderness, negative Lhermitte,  negative Spurling  Thoracic spine:  No midline thoracic tenderness  Lumbar spine:  No midline lumbar tenderness     Diagnostic Results:  I personally reviewed the patient's Diagnostic Imaging.     CT Temporal Bones and MRI Brain W WO Cont 07/16/24  Large arachnoid granulation in the distal right transverse sinus with associated severe dural sinus stenosis.     Small brain protrusion into the arachnoid granulation (in keeping with an encephalocele), unchanged from prior.     Focal venous varix extending along the superior margin of the right tentorium, unchanged.     No new intracranial abnormality elsewhere.     Temporal bones appear within normal limits, specifically without evidence of an associated discrete sigmoid plate dehiscence.    IR Angiogram Carotid Cerebral Bilateral 04/02/23  No arterial abnormalities are seen.  No AVM or arterial aneurysm.     Venous abnormalities are identified including tapering and near complete occlusion of the right transverse-sigmoid sinus junction.  This is identified on right carotid injections.     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  This has slow washout in the venous phase.    ASSESSMENT/PLAN:   33-year-old man, who previously presented with headache now resolved, with venous varix of the right transverse sinus and small encephalocele protruding into arachnoid granulation.    Patient presents with no focal neurological deficits here in clinic.  Discussed imaging results from CT Temporal Bones and MRI Brain revealing large arachnoid granulation in distal right transverse sinus with associated severe dural sinus stenosis. There is also a small brain protrusion into the arachnoid granulation (in keeping with an encephalocele) unchanged with prior. Additionally, unchanged focal venous varix extending along superior margin of right tentorium. Temporal bones appear  WNL.  Also reviewed angiogram demonstrating venous varix adjacent to the right transverse sinus which appears to be within a parallel channel measuring 1.5 x 0.5 cm in diameter. This has slow washout in the venous phase.  After discussion with the patient, I recommend f/u with Fabi Guevara PA-C in one year with repeat MRI Brain W WO Contrast and CT Temporal Bones W WO Contrast. I have answered all of their questions and patient wish to proceed with this plan. We will schedule patient.      Thank you so very much for allowing me to participate in the care of this patient.  Please feel free to call any questions, comments, or concerns.     Evelina Thomas MD,MSc  Department of Neurosurgery   Department of Radiology  Department of Neurology  [facility]    [facility]   [employer]     Total time spent in counseling and discussion about further management options including relevant lab work, treatment,  prognosis, medications and intended side effects was more than 60 minutes. More than 50 % of the time was spent in counseling and coordination of care.  I spent a total of 60 minutes on the day of the visit.This includes face to face time and non-face to face time preparing to see the patient (eg, review of tests), Obtaining and/or reviewing separately obtained history, Documenting clinical information in the electronic or other health record, Independently interpreting resultsand communicating results to the patient/family/caregiver, or Care coordination      Scribe Attestation  I, Dr.Vernard Thomas personally performed the services described in this documentation. All medical record entries made by the scribe, Bridget Arias, were at my direction and in my presence.  I have reviewed the chart and agree that the record reflects my personal performance and is accurate and complete.